# Patient Record
Sex: FEMALE | Race: WHITE
[De-identification: names, ages, dates, MRNs, and addresses within clinical notes are randomized per-mention and may not be internally consistent; named-entity substitution may affect disease eponyms.]

---

## 2017-05-19 ENCOUNTER — HOSPITAL ENCOUNTER (EMERGENCY)
Dept: HOSPITAL 25 - ED | Age: 82
Discharge: HOME | End: 2017-05-19
Payer: MEDICARE

## 2017-05-19 DIAGNOSIS — R21: Primary | ICD-10-CM

## 2017-05-19 PROCEDURE — 99282 EMERGENCY DEPT VISIT SF MDM: CPT

## 2017-05-20 VITALS — DIASTOLIC BLOOD PRESSURE: 74 MMHG | SYSTOLIC BLOOD PRESSURE: 156 MMHG

## 2017-07-29 ENCOUNTER — HOSPITAL ENCOUNTER (EMERGENCY)
Dept: HOSPITAL 25 - ED | Age: 82
Discharge: HOME | End: 2017-07-29
Payer: MEDICARE

## 2017-07-29 VITALS — DIASTOLIC BLOOD PRESSURE: 66 MMHG | SYSTOLIC BLOOD PRESSURE: 173 MMHG

## 2017-07-29 DIAGNOSIS — R51: ICD-10-CM

## 2017-07-29 DIAGNOSIS — Z88.0: ICD-10-CM

## 2017-07-29 DIAGNOSIS — M54.2: Primary | ICD-10-CM

## 2017-07-29 DIAGNOSIS — I10: ICD-10-CM

## 2017-07-29 DIAGNOSIS — F41.0: ICD-10-CM

## 2017-07-29 PROCEDURE — 99282 EMERGENCY DEPT VISIT SF MDM: CPT

## 2017-07-29 NOTE — ED
Braulio BLANTON Auryana, scribed for Misha Dumont MD on 07/29/17 at 1225 .





Neck Pain





- HPI Summary


HPI Summary: 





82 year old female presents with neck pain. Patient reports that the pain 

started 5 days ago while sleeping and was initially locates in the head - 

reports " whole head- face, sides" and hurts to touch but is now also present 

in her neck and shoulder. Daughter reports that her symptoms improved Tuesday 

and went about normal activities, but on Wednesday her symptoms worsened. She 

reports that the pain is a constant throbbing pain and does not radiate. She 

also c/o of decreased appetite due to the severity of the pain. She denies any 

fever, chills, photophobia, abdominal pain, arm pain, lower extremity pain, 

head injuries, or any falls. She reports normal urination and normal 

ambulation. She reports 1/2 tablet of Tylenol every 3-4 hours without any 

improvement. She denies any exposure to ticks - no gardening or pets. She is 

not on any blood thinners.  Patient reports 1 similar episode about one year 

ago that resolved after several months. PMHx is significant for HTN ( 

medication complaint), but no history of DM. SHx is significant for 3-4 oz. of 

wine a day but denies any tobacco use. Dr. Mckee is her PCP.





- History of Current Complaint


Chief Complaint: EDNeckComplaint


Stated Complaint: RADIATING NECK PAIN


Time Seen by Provider: 07/29/17 11:55


Hx Obtained From: Patient


Hx Last Menstrual Period: N/A


Onset/Duration Of Injury/Symptoms: Days


Timing: Constant


Onset/Duration: Gradual Onset, Started days ago, Still Present, Worse Since - 

WEDNESDAY- 1 DAY AGO


Severity Initially: Mild


Severity Currently: Moderate


Pain Intensity: 8


Pain Scale Used: 0-10 Numeric


Location: Discrete At: - started at whole head and has now radiated down the 

neck and shoulder


Alleviating Factors: Nothing


Related History: Similar Episode/Dx As: - see HPI





- Allergies/Home Medications


Allergies/Adverse Reactions: 


 Allergies











Allergy/AdvReac Type Severity Reaction Status Date / Time


 


Penicillins Allergy  Unknown Verified 07/29/17 11:32





   Reaction  





   Details  














PMH/Surg Hx/FS Hx/Imm Hx


Endocrine/Hematology History: 


   Denies: Hx Diabetes, Hx Thyroid Disease


Cardiovascular History: Reports: Hx Hypertension


   Denies: Hx Pacemaker/ICD


Respiratory History: 


   Denies: Hx Asthma, Hx Chronic Obstructive Pulmonary Disease (COPD)


GI History: 


   Denies: Hx Ulcer


Musculoskeletal History: 


   Denies: Hx Scoliosis


Sensory History: 


   Denies: Hx Hearing Aid


Neurological History: Reports: Hx Headaches


   Denies: Other Neuro Impairments/Disorders


Psychiatric History: Reports: Hx Panic Disorder - anxiety does ok mri does not 

like the noise with mri





- Cancer History


Hx Chemotherapy: No


Hx Radiation Therapy: No





- Surgical History


Surgery Procedure, Year, and Place: LEFT BREAST LUMPECTOMY 1958





- Immunization History


Date of Tetanus Vaccine: utd


Date of Influenza Vaccine: utd


Infectious Disease History: No


Infectious Disease History: 


   Denies: Hx Hepatitis, Hx Human Immunodeficiency Virus (HIV), Traveled 

Outside the [place] in Last 30 Days





- Family History


Known Family History: Positive: Cardiac Disease, Diabetes - brother -weight 

related, Other - breast ca





- Social History


Alcohol Use: Daily


Alcohol Amount: 1 glass of wine - apr 3 oz.


Substance Use Type: Reports: None, Prescribed


Smoking Status (MU): Never Smoked Tobacco





Review of Systems


Constitutional: Negative


Negative: Fever, Chills


Eyes: Negative


Negative: Photophobia


ENT: Negative


Cardiovascular: Negative


Respiratory: Negative


Positive: Other - decreased appetite due to pain.  Negative: Abdominal Pain


Genitourinary: Negative


Positive: Other - head pain that radiates to the neck and to the shoulders


Skin: Negative


Neurological: Negative


Psychological: Normal


All Other Systems Reviewed And Are Negative: Yes





Physical Exam





- Summary


Physical Exam Summary: 








The patient is well-nourished and is in mild acute pain distress.





The skin is warm and dry and skin color reflects adequate perfusion.





HEENT:  The head is normocephalic and atraumatic. The pupils are equal and 

reactive. The conjunctivae are clear and without drainage. Patient does not 

have any photophobia. Nares are patent and without drainage.  Mouth reveals 

moist mucous membranes and the throat is without erythema and exudate.  The 

external ears are intact. The ear canals are patent and without drainage. The 

tympanic membranes are intact.





Neck is supple with full range of motion and non-tender. There are no carotid 

bruits.  There is no neck vein distension. There is tenderness at the atlanto-

occipital joint. There is no spinous process tenderness. There are spasms over 

the paracervical vertebrae. 





Respiratory: Chest is non-tender.  Lungs are clear to auscultation and breath 

sounds are symmetrical and equal.





Cardiovascular: Hear is regular rate and rhythm.  There is no murmur or rub 

auscultated.   There is no peripheral edema and pulses are symmetrical and 

equal.





Abdomen: The abdomen is soft and non-tender.  There are normal bowel sounds 

heard in all four quadrants and there is no organomegaly palpated.





Musculoskeletal: There is no back pain noted.  Extremities are non-tender with 

full range of motion and good motor strength.  There is good capillary refill.  

There is no peripheral edema or calf tenderness elicited. There is no 

tenderness in the thoracic or lumbar region.





Neurological: Patient is alert and oriented to person, place and time.  The 

patient has symmetrical motor strength in all four extremities.  Cranial nerves 

are grossly intact. Deep tendon reflexes are symmetrical and equal in all four 

extremities. No focal neurological deficits.





Psychiatric: The patient has an appropriate affect and does not exhibit any 

anxiety or depression. 


Triage Information Reviewed: Yes


Vital Signs On Initial Exam: 


 Initial Vitals











Temp Pulse Resp BP Pulse Ox


 


 99.5 F   68   16   151/61   96 


 


 07/29/17 11:32  07/29/17 11:32  07/29/17 11:32  07/29/17 11:32  07/29/17 11:32











Vital Signs Reviewed: Yes





- Tatamy Coma Scale


Coma Scale Total: 15





Diagnostics





- Vital Signs


 Vital Signs











  Temp Pulse Resp BP Pulse Ox


 


 07/29/17 11:32  99.5 F  68  16  151/61  96














- Laboratory


Lab Statement: Any lab studies that have been ordered have been reviewed, and 

results considered in the medical decision making process.





Re-Evaluation





- Re-Evaluation


  ** First Eval


Re-Evaluation Time: 13:06 - patients symptoms are much improved


Change: Improved





Neck Course/Dx





- Course


Assessment/Plan: 82 year old female presents with neck pain. Patient reports 

that the pain started 5 days ago while sleeping and was initially locates in 

the head - reports "whole head- face, sides" and hurts to touch but is now also 

present in her neck and shoulder. Daughter reports that her symptoms improved 

Tuesday and went about normal activities, but on Wednesday her symptoms 

worsened. She reports that the pain is a constant throbbing pain and does not 

radiate. She also c/o of decreased appetite due to the severity of the pain. 

She denies any fever, chills, photophobia, abdominal pain, arm pain, lower 

extremity pain, head injuries, or any falls. She reports normal urination and 

normal ambulation. She reports 1/2 tablet of Tylenol every 3-4 hours without 

any improvement. She denies any exposure to ticks - no gardening or pets. She 

is not on any blood thinners.  Patient reports 1 similar episode about one year 

ago that resolved after several months. PMHx is significant for HTN (medication 

complaint), but no history of DM. SHx is significant for 3-4 oz. of wine a day 

but denies any tobacco use. Dr. Mckee is her PCP.  Her daughter reports an 

allergy to a previous medication and that the patient is reluctant to take 

anything but Tylenol, however, Tylenol has not improved her symptoms.  Pain 

management was discussed and the patient and daughter agreed to try ibuprofen 

and tramadol for pain management.  On re-evaluation, the patient reports 

improvement of symptoms. Patient will be discharged home with prescription for 

ibuprofen and tramadol and follow up with PCP. Patient agrees with plan.  Dx: 

neck pain, headache.





- Diagnoses


Differential Dx/HQI/PQRI: Positive: Other - degenerative disc disease, acute 

cervical strain and sprain


Provider Diagnoses: 


 Neck pain, Headache








Discharge





- Discharge Plan


Condition: Stable


Disposition: HOME


Prescriptions: 


Ibuprofen TAB* [Advil TAB*] 400 mg PO Q8H PRN #20 tab


 PRN Reason: pain


traMADol TAB* [Ultram*] 25 mg PO Q8H PRN #15 tab MDD 3


 PRN Reason: pain


Patient Education Materials:  Neck Pain (ED), Acute Headache (ED)


Referrals: 


Alice Mckee MD [Primary Care Provider] - 2 Days





The documentation as recorded by the Braulio patel Auryana accurately 

reflects the service I personally performed and the decisions made by me, Misha Dumont MD.

## 2017-10-12 ENCOUNTER — HOSPITAL ENCOUNTER (INPATIENT)
Dept: HOSPITAL 25 - ED | Age: 82
LOS: 7 days | Discharge: PSYCHIATRIC OR SUBSTANCE USE FACILITY | DRG: 885 | End: 2017-10-19
Attending: INTERNAL MEDICINE | Admitting: INTERNAL MEDICINE
Payer: MEDICARE

## 2017-10-12 DIAGNOSIS — E61.1: ICD-10-CM

## 2017-10-12 DIAGNOSIS — R53.1: ICD-10-CM

## 2017-10-12 DIAGNOSIS — E83.42: ICD-10-CM

## 2017-10-12 DIAGNOSIS — M85.80: ICD-10-CM

## 2017-10-12 DIAGNOSIS — D47.3: ICD-10-CM

## 2017-10-12 DIAGNOSIS — R62.7: ICD-10-CM

## 2017-10-12 DIAGNOSIS — G89.29: ICD-10-CM

## 2017-10-12 DIAGNOSIS — Z88.8: ICD-10-CM

## 2017-10-12 DIAGNOSIS — I10: ICD-10-CM

## 2017-10-12 DIAGNOSIS — Z63.4: ICD-10-CM

## 2017-10-12 DIAGNOSIS — M54.2: ICD-10-CM

## 2017-10-12 DIAGNOSIS — Z88.0: ICD-10-CM

## 2017-10-12 DIAGNOSIS — E87.6: ICD-10-CM

## 2017-10-12 DIAGNOSIS — F41.9: ICD-10-CM

## 2017-10-12 DIAGNOSIS — F33.3: Primary | ICD-10-CM

## 2017-10-12 DIAGNOSIS — R51: ICD-10-CM

## 2017-10-12 DIAGNOSIS — Z82.49: ICD-10-CM

## 2017-10-12 LAB
ALBUMIN SERPL BCG-MCNC: 3.6 G/DL (ref 3.2–5.2)
ALP SERPL-CCNC: 50 U/L (ref 34–104)
ALT SERPL W P-5'-P-CCNC: 10 U/L (ref 7–52)
ANION GAP SERPL CALC-SCNC: 11 MMOL/L (ref 2–11)
AST SERPL-CCNC: 15 U/L (ref 13–39)
BUN SERPL-MCNC: 20 MG/DL (ref 6–24)
BUN/CREAT SERPL: 31.7 (ref 8–20)
CALCIUM SERPL-MCNC: 8.7 MG/DL (ref 8.6–10.3)
CHLORIDE SERPL-SCNC: 105 MMOL/L (ref 101–111)
GLOBULIN SER CALC-MCNC: 2.3 G/DL (ref 2–4)
GLUCOSE SERPL-MCNC: 83 MG/DL (ref 70–100)
HCO3 SERPL-SCNC: 25 MMOL/L (ref 22–32)
HCT VFR BLD AUTO: 42 % (ref 35–47)
HGB BLD-MCNC: 13.8 G/DL (ref 12–16)
MAGNESIUM SERPL-MCNC: 1.7 MG/DL (ref 1.9–2.7)
MCH RBC QN AUTO: 27 PG (ref 27–31)
MCHC RBC AUTO-ENTMCNC: 33 G/DL (ref 31–36)
MCV RBC AUTO: 81 FL (ref 80–97)
POTASSIUM SERPL-SCNC: 2.9 MMOL/L (ref 3.5–5)
PROT SERPL-MCNC: 5.9 G/DL (ref 6.4–8.9)
RBC # BLD AUTO: 5.21 10^6/UL (ref 4–5.4)
SODIUM SERPL-SCNC: 141 MMOL/L (ref 133–145)
WBC # BLD AUTO: 11.5 10^3/UL (ref 3.5–10.8)

## 2017-10-12 PROCEDURE — 84100 ASSAY OF PHOSPHORUS: CPT

## 2017-10-12 PROCEDURE — 81003 URINALYSIS AUTO W/O SCOPE: CPT

## 2017-10-12 PROCEDURE — 84132 ASSAY OF SERUM POTASSIUM: CPT

## 2017-10-12 PROCEDURE — 80329 ANALGESICS NON-OPIOID 1 OR 2: CPT

## 2017-10-12 PROCEDURE — 71020: CPT

## 2017-10-12 PROCEDURE — 83735 ASSAY OF MAGNESIUM: CPT

## 2017-10-12 PROCEDURE — 80320 DRUG SCREEN QUANTALCOHOLS: CPT

## 2017-10-12 PROCEDURE — 80053 COMPREHEN METABOLIC PANEL: CPT

## 2017-10-12 PROCEDURE — 36415 COLL VENOUS BLD VENIPUNCTURE: CPT

## 2017-10-12 PROCEDURE — 84443 ASSAY THYROID STIM HORMONE: CPT

## 2017-10-12 PROCEDURE — 85025 COMPLETE CBC W/AUTO DIFF WBC: CPT

## 2017-10-12 PROCEDURE — G0480 DRUG TEST DEF 1-7 CLASSES: HCPCS

## 2017-10-12 PROCEDURE — 93005 ELECTROCARDIOGRAM TRACING: CPT

## 2017-10-12 PROCEDURE — 80307 DRUG TEST PRSMV CHEM ANLYZR: CPT

## 2017-10-12 RX ADMIN — HEPARIN SODIUM SCH: 5000 INJECTION INTRAVENOUS; SUBCUTANEOUS at 16:11

## 2017-10-12 RX ADMIN — POTASSIUM CHLORIDE SCH MLS/HR: 200 INJECTION, SOLUTION INTRAVENOUS at 18:06

## 2017-10-12 RX ADMIN — HEPARIN SODIUM SCH UNITS: 5000 INJECTION INTRAVENOUS; SUBCUTANEOUS at 16:03

## 2017-10-12 RX ADMIN — POTASSIUM CHLORIDE SCH: 200 INJECTION, SOLUTION INTRAVENOUS at 22:04

## 2017-10-12 RX ADMIN — AMLODIPINE BESYLATE SCH MG: 5 TABLET ORAL at 16:03

## 2017-10-12 RX ADMIN — HEPARIN SODIUM SCH: 5000 INJECTION INTRAVENOUS; SUBCUTANEOUS at 22:04

## 2017-10-12 SDOH — SOCIAL STABILITY - SOCIAL INSECURITY: DISSAPEARANCE AND DEATH OF FAMILY MEMBER: Z63.4

## 2017-10-12 NOTE — HP
HISTORY AND PHYSICAL:

 

DATE OF ADMISSION:  10/12/17.

 

HISTORY OF PRESENT ILLNESS:  Danae Ferreira is an 82-year-old woman admitted with 
weakness, refusal to take medications, who has been eating and drinking very 
little since her   5 days ago.  He had been sick for 6 years with 
renal cell cancer, had done fairly well until recently when he was placed on 
hospice.  The patient was present when he .  Since then she has been 
staying in bed.  She has been refusing to eat or drink except for perhaps 2 
crackers and a small amount of cheese every day.  She was brought by her family 
to the emergency room on 10/09/17.  She had a psychiatric evaluation.  At that 
time she was felt to be sad, depressed.  It was noted that she had attempted 
suicide with a drug overdose in ,  had previous delusional behavior.  It 
was deemed that she was not appropriate for mental health admission at that 
time.  She was medically cleared.  She was sent home.  Since then she has been 
staying in bed, not eating.  She has expressed psychotic thoughts such as that 
the devil was coming to take her and that she was going to burn in hell.  The 
family called me.  The grief counsellor from \[facility]", Angelina Guadalupe, went to 
see her  yesterday, and felt that she was not able to deal with her grief until 
her psychiatric condition is addressed.  She had been giving away her personal 
items such as her wedding ring to her daughter, and was felt to be suicide 
risk.  I spoke with Dr. Wheeler who recommended she come back to the emergency 
room.  I am seeing her at this time and will be admitting her.

 

PAST MEDICAL HISTORY:  Is otherwise significant for the following medical 
problems:

 

1.  Hypertension.

2.  History of depression, on sertraline.

3.  Osteopenia.

4.  Chronic neck pain.

5.  History of anxiety.

 

PAST SURGICAL HISTORY:  Includes breast biopsy, age 23, benign.

 

CURRENT MEDICATIONS:  Are as follows but she has not been taking them regularly 
for several weeks according to the family:

 

1.  Magnesium 250 mg daily.

2.  Sertraline 100 mg daily.

3.  Tylenol 500 mg half tablet twice a day.

4.  Triamcinolone hexacetonide 0.1% b.i.d., p.r.n.

5.  Nystatin cream b.i.d., under breast.

6.  Hydroxyzine 10 mg q. 4 h. p.r.n. itching.

7.  Ciclopirox 0.77% topical cream b.i.d. as needed for rash.

8.  Lorazepam 0.5 mg q. 6 h, p.r.n. anxiety.

9.  Amlodipine 5 mg daily.

10.  Cod liver oil 1 daily .

11.  Vitamin D3 400 units daily.

12.  Viactiv 500/500/40 one daily.

13.  Ibuprofen 200 mg q. 6 h. p.r.n.

14.  Triamterene 1 every day.

15.  Potassium chloride 10 mEq 2 every day.

 

ALLERGIES:  FLUCONAZOLE caused dizziness, PENICILLIN.  She previously had a 
rash this summer, felt to be a drug rash, etiology of this rash was not clearly 
determined.

 

HABITS:  Tobacco none.  ETOH, none.

 

FAMILY HISTORY:  Positive for osteoporosis, congestive heart failure, MI.

 

SOCIAL AND PERSONAL HISTORY:  The patient is retired.  She is recently  (
see above).  She has 2 adult children that live in the area.  Another son lives 
out of the area, his name is Jeferson.  He is here with his wife, Leyla,  jimmy.

 

REVIEW OF SYSTEMS:  Generally, she has been very weak.  Her appetite has been 
poor. She denied fevers, chills or sweats.  She has been staying in bed.  Skin:
  See above.  HEENT:  Negative.  Nodes:  Negative.  Heme:  Negative.  Breasts:  
Negative. Endocrine:  Negative.  Respiratory:  Negative.  Cardiovascular:  
Hypertension.  She has not been taking her medications.  GI:  Negative.  :  
Negative.  GYN: Negative.  Musculoskeletal:  See above.  Neuro:  See above.  
Psychiatric:  See above.

 

                               PHYSICAL EXAMINATION

 

GENERAL:  She is elderly white female, in no acute distress, lying on the 
stretcher with her eyes closed, not responding to my questions.  She does not 
follow commands.  Family does state that prior to her being taken here by 
ambulance she was refusing to go and actually crawled away from her bed to get 
away from the ambulance attendants.

 

VITAL SIGNS:  Blood pressure 164/73, pulse 72, respirations 24, temperature 99.1
, O2 sat 92%.

 

HEENT:  Atraumatic.  Normocephalic.  Eyes closed.  Mouth closed, unable to open 
mouth.

 

NECK:  Supple.  No thyromegaly.

 

CHEST:  Clear.

 

HEART:  Normal S2, there is a grade 1/6 to 2/6 systolic murmur.

 

ABDOMEN:  Soft.  Nontender.  No masses or organomegaly.  Bowel sounds are 
active.

 

EXTREMITIES:  Are without cyanosis, clubbing, or edema.

 

NEUROLOGIC:  She does not follow commands.  DTR are 1 to 2+ symmetric.

 

SKIN:  Is warm and dry.

 

 LABORATORY DATA:  Laboratory is pending.  Labs from 10/09/17, showed a normal 
CBC with a slightly low MCV.  Potassium 2.6 but came up to 3.6.  Negative tox 
screen. Negative UA.

 

IMPRESSION:  Patient with weakness and depression status post death of her 
. She is having delusions so will get a psychiatric evaluation, I will 
give her IV fluids. Labs have been ordered.  She will get heparin for DVT 
prophylaxis.  She is a full code.  I found her healthcare proxy form  and it is 
her . I have asked the family to try to get her to name another 
healthcare proxy.  We will check a ferritin level because of her low MCV.

 

 

 

[id number]/[facility] #: [id number]

[facility]

## 2017-10-13 RX ADMIN — HEPARIN SODIUM SCH: 5000 INJECTION INTRAVENOUS; SUBCUTANEOUS at 14:36

## 2017-10-13 RX ADMIN — HEPARIN SODIUM SCH: 5000 INJECTION INTRAVENOUS; SUBCUTANEOUS at 22:00

## 2017-10-13 RX ADMIN — HEPARIN SODIUM SCH: 5000 INJECTION INTRAVENOUS; SUBCUTANEOUS at 06:10

## 2017-10-13 RX ADMIN — AMLODIPINE BESYLATE SCH MG: 5 TABLET ORAL at 09:41

## 2017-10-13 NOTE — CONS
CONSULTATION REPORT:

 

DATE OF CONSULTATION / DICTATION:  10/13/17

 

ATTENDING PHYSICIAN:  Alice Mckee MD

 

CONSULTING PHYSICIAN:  Prasanth Wheeler MD

 

REASON FOR CONSULT:  Suicidal depression.

 

SUBJECTIVE HISTORY:  The patient is an 82-year-old recently  white 
female with a history of episodic depression and psychosis who is brought to 
the hospital by her family due to concerns that she has not been eating or 
drinking since her   of renal cancer 5 days prior.  Apparently, he 
had been sick for 6 years with cancer but had recently  in hospice care.  
According to the family, the patient has been staying in bed since his death 
refusing to eat or drink.  She was brought by the family to the emergency room 
on 10/09/17 where she received a psychiatric evaluation, although her condition 
was not deemed to require restrictive inpatient care at that time.  The family 
remained quite concerned about her behavior.  They indicate that she has a long 
history of depression and had been hospitalized on several occasions and 
received successful treatment.  They indicated that this recent episode of 
depression started approximately 1-1/2 years ago when she was the primary 
caregiver for her .  Similar to previous episodes, she was expressing a 
fear that Vince was coming to take her due to unforgivable sins that she would 
burn in hell for.  She was also delusionally convinced that her daughter was 
keeping her  still alive at her house.  The family indicates that the 
patient has been nonadherent with her prescribed Zoloft for at least the past 
month.  They indicate that in her home they found a bottle of Zoloft with a 
fill date of 17, which had 35 of 45 pills remaining.  On exam, the 
patient is cooperative and expressive.  She speaks spontaneously about the 
difficulty she had taking care of her  until he  and she does admit 
that she still believes that he is alive.  When asked about the delusions 
regarding Satan coming to get her, she admits that she is experiencing these 
thoughts as well.  The patient is screened for neurovegetative symptoms of 
depression and she does endorse sleeplessness, anhedonia, guilt, poor energy, 
concentration difficulties, lack of appetite, psychomotor retardation, and 
thoughts of death. When asked about her antidepressants, she denies 
nonadherence insisting that she has kept up with all of her medications.  When 
I spoke with the nurse currently taking care of her, they indicate that she has 
eaten a little bit and showered today and appears to be more talkative and 
expressive compared to when she arrived. The patient is still endorsing 
suicidal ideations believing that the best thing for her would be left alone; 
however, with a little encouragement, she is willing to come to the behavioral 
science unit.

 

PAST PSYCHIATRIC HISTORY:  The patient indicates she has had between 2 and 3 
prior admissions, all here at [facility], but the most recent being 
well over 20 years ago.  She indicates she has been on several antidepressants; 
however, she does not recall the names of any of these.  The patient indicates 
that she did have a suicide attempt in  whereas it is documented that this 
was via a drug overdose.  The patient believes that she actually attempted to 
hang herself.  The patient has no history of violence towards others.  She 
denies any history of being a victim of abuse or neglect.  She has no history 
of traumatic brain injury.

 

SUBSTANCE ABUSE HISTORY:  Negative for alcohol or illicit drugs.  She is a 
nonsmoker.

 

PAST MEDICAL HISTORY:  Significant for:

1.  Hypertension.

2.  Osteopenia.

3.  Chronic neck pain.

 

CURRENT MEDICATIONS:

1.  Magnesium 250 mg daily.

2.  Sertraline 100 mg daily.

3.  Tylenol 500 mg twice daily.

4.  Triamcinolone hexacetonide 0.1 mg b.i.d. as a p.r.n.

5.  Nystatin cream b.i.d. under breasts.

6.  Hydroxyzine 10 mg every 4 hours for itching.

7.  Ciclopirox 0.77% topical cream b.i.d. as needed for rash.

8.  Lorazepam 0.5 mg every 6 hours as a p.r.n. for anxiety.

9.  Amlodipine 5 mg p.o. daily.

10.  Cod liver oil 1 daily.

11.  Vitamin D3 400 units p.o. daily.

12.  Viactiv 500/500/40 one daily.

13.  Ibuprofen 200 mg every 6 hours as needed for pain.

14.  Triamterene 1 every day.

15.  Potassium chloride 10 mEq twice daily.

 

ALLERGIES:  She is allergic to FLUCONAZOLE, which causes dizziness and 
PENICILLIN.

 

FAMILY PSYCHIATRIC HISTORY:  Noncontributory.

 

SOCIAL HISTORY:  The patient was born in [facility], the oldest of 3 
children to an intact family.  She does have a college degree, which was a 
bachelor's in science and home economics from a university in [place].  She 
[place] [place] in  where her  was pursuing an industrial 
engineering degree.  She has been  x1 and they were  61 years 
until his death 6 days ago. Currently, she is living alone in a one-shabana 
building.  She has 3 children, a son named Darrell, second son named Kamran, and 
a daughter named Chanell.  The patient worked odd jobs such as substitute 
teaching and as an .  She did own rental property and 
had income from this.  She self-identifies as strongly Church and attends the 
[employer] in [place], [place]. The patient has no 
discernable legal or  history.

 

MENTAL STATUS EXAM:  The patient is an aging white female who is somewhat thin 
and frail appearing.  She is lying in bed, slightly propped up with rosary 
beads around her wrists and hand and laced through her fingers.  She is calm, 
cooperative. Makes fair eye contact.  Her speech is expressive with normal rate
, tone, and volume.  She is clearly depressed with a constricted affect.  
Thought process is linear, goal directed.  Thought content is significant for 
delusions that her  is still alive and that the devil is coming to get 
her for her past transgressions.  She does deny auditory or visual 
hallucinations.  The patient endorses passive suicidal thoughts, but denies any 
active plan to end her life. She denies homicidality.  Insight and judgment are 
poor given her limited adherence to antidepressant medication.  Cognitively, 
she is awake and alert with what would appear to be an average intellect.

 

DIAGNOSES: 

Axis I:  Major depressive disorder, recurrent, severe with psychotic features. 

Axis II:  Deferred.

 

ASSESSMENT:  The patient is an 82-year-old recently  white female with a 
history of recurrent psychotic depression who was brought to the hospital for 
the second time in 3 days secondary to failure to thrive, delusions that her 
  is still alive and passive suicidal thoughts.  At this time, 
she is hospitalized on the medical service and receiving IV fluids and her p.o. 
intake appears to be improving.  I do think the patient would benefit from 
further inpatient care on the behavioral science unit and I am advocating that 
she be transferred to my service on the behavioral science unit.  I have spoken 
with Dr. Mckee and made her aware of my findings.

 

RECOMMENDATIONS TO PRIMARY TEAM:  I recommend that the patient be transferred 
tomorrow, which is 10/14/17 to the behavioral science unit.  This clinician 
will not be present in the hospital until Monday and therefore, I will refer 
the matter to the on-call psychiatrist, Dr. Surekha Cruz, who will be the 
admitting clinician. For now, I do not recommend any changes in her medications
, although I discussed with both the patient and her family the introduction of 
low dose Seroquel 25 mg p.o. q.h.s. as an augmentation strategy for her 
antidepressant and she should receive some additional antipsychotic benefit 
from this.

 

Thank you for allowing me to participate in the care of this toby patient, 
and Psychiatry will continue to follow until she is transferred to our service.

 

 [id number]/[facility] #: [id number]

DEIRDRE

## 2017-10-14 RX ADMIN — QUETIAPINE SCH MG: 25 TABLET, FILM COATED ORAL at 21:28

## 2017-10-14 RX ADMIN — HEPARIN SODIUM SCH: 5000 INJECTION INTRAVENOUS; SUBCUTANEOUS at 21:30

## 2017-10-14 RX ADMIN — AMLODIPINE BESYLATE SCH MG: 5 TABLET ORAL at 07:21

## 2017-10-14 RX ADMIN — HEPARIN SODIUM SCH: 5000 INJECTION INTRAVENOUS; SUBCUTANEOUS at 13:27

## 2017-10-14 RX ADMIN — HEPARIN SODIUM SCH: 5000 INJECTION INTRAVENOUS; SUBCUTANEOUS at 04:10

## 2017-10-14 RX ADMIN — SERTRALINE HYDROCHLORIDE SCH MG: 100 TABLET, FILM COATED ORAL at 11:06

## 2017-10-14 NOTE — TRS
CC:  Prasanth Wheeler MD *

 

TRANSFER SUMMARY:

 

DATE OF ADMISSION:  10/12/17

 

DATE OF DISCHARGE:  10/19/17

 

TRANSFER DIAGNOSES:

1.  Suicidal depression with psychotic features.

2.  History of hypertension.

3.  Hypokalemia.

4.  Low mean corpuscular volume, possible iron deficiency.

5.  Hypomagnesemia.

6.  Thrombocytosis, possibly related to iron deficiency.

7.  Chronic neck pain.

8.  History of anxiety.

9.  Osteopenia.

 

HISTORY:  Danae Ferreira is an 82-year-old woman admitted with weakness, refusal 
to take medications, eating and drinking very little since the death of her 
 5 days previous.  Please see the dictated admission note for details of 
the present illness, past medical history, family history, social and personal 
history, review of systems, and physical examination.

 

LABORATORY DATA:  CBC on 10/12/17, WBC 11.5, H and H 13.8/42, MCV 81 (MCV a few 
days ago had been 79).  PLT 589K.  Chemistries:  Sodium 141, potassium 2.9, 
chloride 105, CO2 25, BUN and creatinine 20/0.63, glucose 83, calcium 8.7.  
Rest of the comprehensive metabolic panel was within normal limits except for 
magnesium 1.7, total protein 5.9.

 

IMAGING:  None.

 

CONSULTATION NOTE:  Dr. Wheeler, Psychiatry, 10/13/17, he felt that the patient 
had a recurrent psychotic depression, who had been brought to the hospital for 
the second time in 3 days secondary to failure to thrive, delusions that her 
  is still alive and passive suicidal thoughts.  He felt that 
she was improving with being on the medical service and having received IV 
fluids.  He did think that the patient would benefit from further inpatient 
care on the behavioral science unit and advocated that she be transferred to 
his service on the behavioral science unit.

 

HOSPITAL COURSE:  The patient was admitted.  She was placed on IV fluids.  
Heparin was used for DVT prophylaxis.  Because she was not speaking when she 
first admitted, a MOLST form could not be completed and her previous healthcare 
proxy had been only her  who has now .  She was made a full 
code.  This will need to be addressed once her depression has been treated.  
Heparin was ordered for DVT prophylaxis, but she refused it.  She refused labs 
after her initial admission labs.  She did agree to take antihypertensives when 
her blood pressure was 182/69 on the morning of 10/13/17.  She also agreed to 
take antidepressants.  She is being restarted on amlodipine and sertraline.  
She began to eat and drink.  She did pull out her IV.  At the time of transfer, 
she is to be on sertraline 100 mg daily, amlodipine 5 mg daily, vitamin D3 1000 
units daily and Seroquel 25 mg which is recommended by Dr. Wheeler.  A ferritin 
is pending, added on to the blood work done when she was admitted.  They have 
been ordered for the day following admission, but she had refused further blood 
work.

Addendum: She was supposed to have been transferred to the behavioral health 
unit on 10/13 but they did not accept her because of bed availability until 10/
19. A transfer to [place] [facility] was suggested but her family 
felt it would be a hardship on her and on them for her to go that far away. She 
did eat and drink. She refused blood work because she felt it sucked the life 
out of her but did agree to a chest Xray and ECG. She initially felt 
oversedated with Seroquel 25 mg but then didn't sleep well with 12.5 mg so dose 
then put back to 25 mg. Dr. Wheeler did continue to see her in psychiatric 
consultation while on the medical floor. She continued to have delusional 
ideation and  passive suicidal thoughts. She did walk around the unit and 
showered prior to transfer. 



 [id number]/[facility] #: [id number]

MTDD

## 2017-10-15 RX ADMIN — HEPARIN SODIUM SCH: 5000 INJECTION INTRAVENOUS; SUBCUTANEOUS at 21:49

## 2017-10-15 RX ADMIN — HEPARIN SODIUM SCH: 5000 INJECTION INTRAVENOUS; SUBCUTANEOUS at 06:43

## 2017-10-15 RX ADMIN — QUETIAPINE SCH MG: 25 TABLET, FILM COATED ORAL at 21:40

## 2017-10-15 RX ADMIN — AMLODIPINE BESYLATE SCH MG: 5 TABLET ORAL at 09:02

## 2017-10-15 RX ADMIN — SERTRALINE HYDROCHLORIDE SCH MG: 100 TABLET, FILM COATED ORAL at 09:02

## 2017-10-15 RX ADMIN — HEPARIN SODIUM SCH: 5000 INJECTION INTRAVENOUS; SUBCUTANEOUS at 12:02

## 2017-10-15 NOTE — PN
Subjective





- Subjective


Reason for Note: Progress Note


History: 





I discussed Danae Ferreira with Dr. Alice Mckee.  She is awaiting transfer to 

the Behavioral Health Unit.  She tells me she is worn out, tired.  She slept in 

"bouts" last night and doesn't feel refreshed. She ate an egg this morning from 

her breakfast.  She has walked to the door of her room and back.  She denies 

any focal symptoms.  She exhibits no delusional behavior.


Active Problems: 


 Active Problems





Bereavement (Acute) Z63.4


Depression (Acute) F32.9


Fatigue (Acute) R53.83


Essential hypertension (Chronic) I10








Current Medications: 


 Current Medications





Amlodipine Besylate (Norvasc Tab*)  5 mg PO DAILY [facility]


   Last Admin: 10/14/17 07:21 Dose:  5 mg


Heparin Sodium (Porcine) (Heparin Vial(*))  5,000 units SUBCUT Q8HR [facility]


   Last Admin: 10/15/17 06:43 Dose:  Not Given


Potassium Chloride/Sodium Chloride (Ns 0.9% W/ 40 Meq Kcl 1000 Ml*)  1,000 mls 

@ 100 mls/hr IV PER RATE [facility]


   Last Admin: 10/12/17 16:00 Dose:  100 mls/hr


Quetiapine Fumarate (Seroquel Tab*)  25 mg PO BEDTIME [facility]


   Last Admin: 10/14/17 21:28 Dose:  25 mg


Sertraline HCl (Zoloft*)  100 mg PO DAILY [facility]


   Last Admin: 10/14/17 11:06 Dose:  100 mg








Home Medications: 


 Home Medications











 Medication  Instructions  Recorded  Confirmed  Type


 


Acetaminophen [Tylenol Extra 250 mg PO BID 05/28/13 10/12/17 History





Strength]    


 


Triamterene/HCTZ 37.5-25 MG* 1 cap PO DAILY 05/28/13 10/12/17 History





[Dyazide*]    


 


Calcium W/ Vitamins D & K [Viactiv 1 chw PO DAILY 10/09/17 10/12/17 History





500-500-40 mg-Unt-Mcg]    


 


Cholecalciferol TAB* [Vitamin D 400 unit PO DAILY 10/09/17 10/12/17 History





TAB*]    


 


Ciclopirox Olamine [Ciclopirox] 0.77 % TOPICAL BID 10/09/17 10/12/17 History


 


Cod Liver Oil 1 cap PO DAILY 10/09/17 10/12/17 History


 


Ibuprofen TAB* [Advil TAB*] 200 mg PO Q6HR PRN 10/09/17 10/12/17 History


 


LORazepam TAB(*) [Ativan 0.5 MG 0.5 mg PO Q6HR PRN 10/09/17 10/12/17 History





TAB (*)]    


 


Magnesium [Essential Magnesium] 250 mg PO DAILY 10/09/17 10/12/17 History


 


Nystatin CREAM* [Nystatin Cream*] 1 applic TOPICAL BID 10/09/17 10/12/17 History


 


Potassium Chlor TAB (NF) 20 meq PO DAILY 10/09/17 10/12/17 History





[Kaon-Cl-10 TAB (NF)]    


 


Sertraline* [Zoloft*] 100 mg PO DAILY 10/09/17 10/12/17 History


 


Triamcinolone 0.1% CREAM(NF) 1 applic TOPICAL BID 10/09/17 10/12/17 History





[Kenalog Cream 0.1%(NF)]    


 


Triamcinolone 0.5% CREAM(NF) 1 applic TOPICAL BID 10/09/17 10/12/17 History





[Triamcinolone 0.5% CREAM*]    


 


amLODIPine TAB* [Norvasc 5 mg TAB*] 5 mg PO DAILY 10/09/17 10/12/17 History


 


hydrOXYzine HCL TAB* [Atarax 10 MG 10 mg PO Q4HR PRN 10/09/17 10/12/17 History





TAB*]    











Allergies: 


 Allergies











Allergy/AdvReac Type Severity Reaction Status Date / Time


 


Fluconazole Allergy  Unknown Verified 10/12/17 19:03





   Reaction  





   Details  


 


Penicillins Allergy  Unknown Verified 10/12/17 11:47





   Reaction  





   Details  














Objective





- Vital Signs


Vital Signs: 


 Vital Signs











  10/14/17 10/14/17 10/14/17





  11:07 15:23 15:32


 


Temperature  97.8 F 98.3 F


 


Pulse Rate  80 78


 


Respiratory  20 18





Rate   


 


Blood Pressure 152/68 144/57 150/68





(mmHg)   


 


O2 Sat by Pulse  97 





Oximetry   














  10/14/17 10/14/17 10/15/17





  19:20 20:00 08:03


 


Temperature 97.9 F  97.9 F


 


Pulse Rate 73  71


 


Respiratory 16 18 16





Rate   


 


Blood Pressure 163/72  166/59





(mmHg)   


 


O2 Sat by Pulse 96  96





Oximetry   














- Intake and Output


Intake and Output: 


 Intake & Output











 10/12/17 10/13/17 10/14/17 10/15/17





 11:59 11:59 11:59 11:59


 


Intake Total  968 665 600


 


Output Total  0 675 2


 


Balance  968 -10 598


 


Weight  117 lb 3.2 oz  


 


Intake:    


 


  IV Fluids  333  


 


    NS (0.9%) 40 meq KCL  333  


 


  IVPB  185  


 


    NS (0.9%) 40 meq KCL  185  


 


  Oral  450 665 600


 


Output:    


 


  Urine  0 675 


 


  Straight Cath    2


 


Other:    


 


  Estimated Void    Medium


 


  # Bowel Movements  0 0 0


 


  Estimated Stool Amount   Medium 


 


  # Voids   0 1





 





ADLs: Meal  Record                                         Start:  10/12/17 13:

34


Freq:   DAILY@0900,1400,1800                               Status: Active      

  


 Created      10/12/17 13:34  System  (Rec: 10/12/17 13:34  System  MED-C13)


 Document     10/12/17 18:00  [id number]  (Rec: 10/12/17 19:05  [id number]  MED-C09)


 Document     10/13/17 09:00  [id number]  (Rec: 10/13/17 09:51  [id number]  MED-C09)


 Document     10/13/17 13:53  [id number]  (Rec: 10/13/17 13:55  [id number]  MED-C09)


 Document     10/13/17 18:00  [id number]  (Rec: 10/13/17 18:37  [id number]  MED-C09)


 Document     10/14/17 09:00  [id number]  (Rec: 10/14/17 09:17  [id number]  MED-C09)


 Document     10/14/17 13:39  [id number]  (Rec: 10/14/17 13:39  [id number]  MED-C09)


 Document     10/14/17 17:51  [id number]  (Rec: 10/14/17 17:51  [id number]  MED-C11)


Intake and Output                                          Start:  10/12/17 11:

46


Freq:                                                      Status: Cancelled   

  


 Created      10/12/17 11:46  System  (Rec: 10/12/17 11:46  System  ED-C26)


Intake and Output                                          Start:  10/12/17 13:

34


Freq:   DAILY@0600,1400,2200                               Status: Cancelled   

  


 Created      10/12/17 13:34  System  (Rec: 10/12/17 13:34  System  MED-C13)


Intake and Output                                          Start:  10/12/17 14:

47


Freq:   06,14,2200                                         Status: Active      

  


 Created      10/12/17 15:01  [id number]  (Rec: 10/12/17 15:01  BKG  [id number])


 Document     10/13/17 06:00  [id number]  (Rec: 10/13/17 06:31  [id number]  MED-C42)


 Document     10/13/17 13:53  [id number]  (Rec: 10/13/17 13:55  [id number]  MED-C09)


 Document     10/13/17 22:00  [id number]  (Rec: 10/13/17 22:14  [id number]  MED-C09)


 Document     10/14/17 06:00  [id number]  (Rec: 10/14/17 06:19  [id number]  MED-C42)


 Document     10/14/17 13:20  [id number]  (Rec: 10/14/17 13:20  [id number]  MED-M01)


 Document     10/14/17 19:40  [id number]  (Rec: 10/14/17 19:40  [id number]  MED-C11)


 Document     10/15/17 06:00  [id number]  (Rec: 10/15/17 06:12  [id number]  MED-C42)











- Physical Exam


General Physical Exam Comment: Holding her rosary, sad, but answers questions 

appropriately.





Assessment





- Problem List


Assessment: 


Patient Problems





Bereavement (Acute)


Depression (Acute)


Fatigue (Acute)


Essential hypertension (Chronic)








Plan: 








Bereavement (Acute)Depression (Acute)Fatigue (Acute)  She expresses symptoms of 

fatigue, but volunteered no other focal symptoms.  She is adjusting to her new 

psychopharmacology.  She is medically ready for transfer to the Behavioral 

Health Unit when a bed becomes available


Essential hypertension (Chronic)  Acceptable BP at present.

## 2017-10-16 RX ADMIN — QUETIAPINE SCH MG: 25 TABLET, FILM COATED ORAL at 20:22

## 2017-10-16 RX ADMIN — AMLODIPINE BESYLATE SCH: 5 TABLET ORAL at 08:30

## 2017-10-16 RX ADMIN — Medication SCH: at 12:14

## 2017-10-16 RX ADMIN — POTASSIUM CHLORIDE SCH MEQ: 750 TABLET, FILM COATED, EXTENDED RELEASE ORAL at 12:07

## 2017-10-16 RX ADMIN — HEPARIN SODIUM SCH: 5000 INJECTION INTRAVENOUS; SUBCUTANEOUS at 05:04

## 2017-10-16 RX ADMIN — HEPARIN SODIUM SCH: 5000 INJECTION INTRAVENOUS; SUBCUTANEOUS at 20:20

## 2017-10-16 RX ADMIN — HEPARIN SODIUM SCH: 5000 INJECTION INTRAVENOUS; SUBCUTANEOUS at 13:05

## 2017-10-16 RX ADMIN — SERTRALINE HYDROCHLORIDE SCH: 100 TABLET, FILM COATED ORAL at 08:31

## 2017-10-16 RX ADMIN — TRIAMTERENE AND HYDROCHLOROTHIAZIDE SCH CAP: 25; 37.5 CAPSULE ORAL at 12:07

## 2017-10-16 RX ADMIN — POTASSIUM CHLORIDE SCH MEQ: 750 TABLET, FILM COATED, EXTENDED RELEASE ORAL at 12:13

## 2017-10-16 NOTE — CONSULT
Identification





- Patient Identification


Reason for Psychiatric Consultation: Suicidal Ideation


-: 


Patient is a 82 year old, F admitted on 10/12/17.








- MHU Identification


Employment Status: Disabled


Hx Psychiatric Hospitalization: Yes





History





- Objective


HPI: 





Danae is seen on 4N for follow up as she awaits an available bed on the 

inpatient psychiatric unit.  I understand that she remains neurovegetative and 

depressed over the weekend and did not tolerate 25mg strength quetiapine 

secondary to sedation.  On exam she is calm and polite, speaking clearly and 

spontaneously, albeit softly.  "I've been thinking about being in the light.  It

's something I can't stop obsessing about.  It's opposed to all this darkness."

  I ask if this is a suicidal statement, and she essentially agrees that she 

views death as a blessing that would deliver her from her hardships.  She 

remains delusional that her  is still alive and remains guilty about 

perceived past moral shortcomings.  She has eaten some of the eggs on her 

breakfast plate but staff reports indicate she has poor caloric intake.





Exam


Appearance: Thin Framed


Hygiene: Normal


Grooming: Fairly Well Kept


Psychomotor Activities: Abnormal-Decreased


Exhibits Abnormal Movement: No


Attitude and Relatedness: Cooperative


Eye Contact: Fair





- Speech


Quality: Unpressured


Latencies: Normal


Quantity: Appropriate


Patient's Decription of Mood: "Sad"


Observed Affect: Constricted


Affect Consistent with: Dysphoria


Patient's Thought Process: Coherent


Thought Content: Yes Passive Death Wish, Yes Paranoid Ideation, No Suicidal 

Planning, No Homicidal Ideation


Experiencing Hallucinations: Yes


Type of Hallucinations: Visual: Yes, Auditory: No, Command: No


Level of Consciousness: Lethargic


Orientation: Yes Intact, Yes Orientated to Time, Yes Orientated to Place, Yes 

Orientated to Person


Impulse Control: Tenuous


Insight and Judgement: Poor





Impression





- Impression


Clinical Impression: 





82 y.o. recently  white female with a history of episodic psychotic 

depression and 2-3 historic admissions to the BSU, who is currently admitted to 

medicine for failure to thrive; consulted on secondary to passive SI and 

delusional thoughts that her  is still alive and that Satan is coming to 

take her soul.  


Inpatient DSM-IV Dx: MDD, recurrent, severe with psychotic features


Merits Inpatient Hospitalization: Yes





Problem List





- MHU Problems


Type of Problem: Mood


Status of Problem: Active





Plan





- Treatment Plan


Treatment Plan: 





Patient awaits transfer to BSU, pending female bed availability.  Recommend 

continuation of sertraline 100mg PO qday.  Will lower quetiapine to 12.5mg PO 

qhs.  Psychiatry will continue to follow until transferred to our service.


Continued Medication Management: Different Medication


Medications: 


 Current Medications





Amlodipine Besylate (Norvasc Tab*)  5 mg PO DAILY [facility]


   Last Admin: 10/16/17 08:30 Dose:  Not Given


Heparin Sodium (Porcine) (Heparin Vial(*))  5,000 units SUBCUT Q8HR [facility]


   Last Admin: 10/16/17 05:04 Dose:  Not Given


Potassium Chloride/Sodium Chloride (Ns 0.9% W/ 40 Meq Kcl 1000 Ml*)  1,000 mls 

@ 100 mls/hr IV PER RATE [facility]


   Last Admin: 10/12/17 16:00 Dose:  100 mls/hr


Non Formulary Med* (Magnesium 250 Mg)  1 admin PO DAILY [facility]


Potassium Chloride (Klor Con Er Tab*)  20 meq PO DAILY [facility]


Quetiapine Fumarate (Seroquel Tab*)  12.5 mg PO BEDTIME [facility]


Sertraline HCl (Zoloft*)  100 mg PO DAILY [facility]


   Last Admin: 10/16/17 08:31 Dose:  Not Given


Triamterene/HCTZ (Dyazide Cap*)  1 cap PO DAILY [facility]











- Discharge Plan


Discharge Plan: Inpatient Hospitalization

## 2017-10-17 RX ADMIN — SERTRALINE HYDROCHLORIDE SCH MG: 100 TABLET, FILM COATED ORAL at 12:59

## 2017-10-17 RX ADMIN — HEPARIN SODIUM SCH: 5000 INJECTION INTRAVENOUS; SUBCUTANEOUS at 13:00

## 2017-10-17 RX ADMIN — HEPARIN SODIUM SCH: 5000 INJECTION INTRAVENOUS; SUBCUTANEOUS at 21:05

## 2017-10-17 RX ADMIN — QUETIAPINE SCH MG: 25 TABLET, FILM COATED ORAL at 21:06

## 2017-10-17 RX ADMIN — AMLODIPINE BESYLATE SCH MG: 5 TABLET ORAL at 13:00

## 2017-10-17 RX ADMIN — TRIAMTERENE AND HYDROCHLOROTHIAZIDE SCH CAP: 25; 37.5 CAPSULE ORAL at 12:59

## 2017-10-17 RX ADMIN — POTASSIUM CHLORIDE SCH MEQ: 750 TABLET, FILM COATED, EXTENDED RELEASE ORAL at 13:00

## 2017-10-17 RX ADMIN — HEPARIN SODIUM SCH: 5000 INJECTION INTRAVENOUS; SUBCUTANEOUS at 05:19

## 2017-10-17 RX ADMIN — Medication SCH: at 13:00

## 2017-10-17 NOTE — CONSULT
Identification





- Patient Identification


Reason for Psychiatric Consultation: Suicidal Ideation


-: 


Patient is a 82 year old, F admitted on 10/12/17.








- MHU Identification


Employment Status: Disabled


Hx Psychiatric Hospitalization: Yes





History





- Objective


HPI: 





Danae is seen on 4N for follow up as she awaits an available bed on the 

inpatient psychiatric unit.  She remains depressed and withdrawn, although 

cooperative and sweet on interaction.  The patient is aware that she is pending 

admission to the BSU and expresses a preference to this, over the alternative 

of being transferred to an outside facility.  I spoke with both Dr. Mckee 

and the patient's daughter, Chanell ([phone]), who are mutually in favor of 

waiting for a BSU bed as opposed to seeking transfer to specialty geriatric 

psych care.  The patient remains delusional that her   is still 

alive and is still endorsing passive suicidality.





Exam


Appearance: Thin Framed


Hygiene: Normal


Grooming: Fairly Well Kept


Psychomotor Activities: Abnormal-Decreased


Exhibits Abnormal Movement: No


Attitude and Relatedness: Cooperative


Eye Contact: Fair





- Speech


Quality: Unpressured


Latencies: Normal


Quantity: Appropriate


Patient's Decription of Mood: "Sad"


Observed Affect: Constricted


Affect Consistent with: Dysphoria


Patient's Thought Process: Coherent


Thought Content: Yes Passive Death Wish, Yes Paranoid Ideation, No Suicidal 

Planning, No Homicidal Ideation


Experiencing Hallucinations: Yes


Type of Hallucinations: Visual: Yes, Auditory: No, Command: No


Level of Consciousness: Lethargic


Orientation: Yes Intact, Yes Orientated to Time, Yes Orientated to Place, Yes 

Orientated to Person


Impulse Control: Tenuous


Insight and Judgement: Poor





Impression





- Impression


Clinical Impression: 





82 y.o. recently  white female with a history of episodic psychotic 

depression and 2-3 historic admissions to the BSU, who is currently admitted to 

medicine for failure to thrive; consulted on secondary to passive SI and 

delusional thoughts that her  is still alive and that Satan is coming to 

take her soul.  


Inpatient DSM-IV Dx: MDD, recurrent, severe with psychotic features


Merits Inpatient Hospitalization: Yes





Problem List





- MHU Problems


Type of Problem: Mood


Status of Problem: Active





Plan





- Treatment Plan


Treatment Plan: 





Patient awaits transfer to BSU, pending female bed availability.  Recommend 

continuation of sertraline 100mg PO qday and quetiapine 12.5mg PO qhs.  

Psychiatry will continue to follow until transferred to our service.


Continued Medication Management: Start Medication


Medications: 


 Current Medications





Amlodipine Besylate (Norvasc Tab*)  5 mg PO DAILY [facility]


   Last Admin: 10/17/17 13:00 Dose:  5 mg


Heparin Sodium (Porcine) (Heparin Vial(*))  5,000 units SUBCUT Q8HR [facility]


   Last Admin: 10/17/17 13:00 Dose:  Not Given


Potassium Chloride/Sodium Chloride (Ns 0.9% W/ 40 Meq Kcl 1000 Ml*)  1,000 mls 

@ 100 mls/hr IV PER RATE [facility]


   Last Admin: 10/12/17 16:00 Dose:  100 mls/hr


Non Formulary Med* (Magnesium 250 Mg)  1 admin PO DAILY [facility]


   Last Admin: 10/17/17 13:00 Dose:  Not Given


Potassium Chloride (Klor Con Er Tab*)  20 meq PO DAILY [facility]


   Last Admin: 10/17/17 13:00 Dose:  20 meq


Quetiapine Fumarate (Seroquel Tab*)  12.5 mg PO BEDTIME [facility]


   Last Admin: 10/16/17 20:22 Dose:  12.5 mg


Sertraline HCl (Zoloft*)  100 mg PO DAILY [facility]


   Last Admin: 10/17/17 12:59 Dose:  100 mg


Triamterene/HCTZ (Dyazide Cap*)  1 cap PO DAILY [facility]


   Last Admin: 10/17/17 12:59 Dose:  1 cap











- Discharge Plan


Discharge Plan: Inpatient Hospitalization

## 2017-10-17 NOTE — RAD
INDICATION: Hypertension     



COMPARISON: None

 

TECHNIQUE: PA and lateral dual-energy views were obtained.



FINDINGS: 



Bones/Soft Tissues: There are no acute bony findings.    



Cardiomediastinal: The cardiomediastinal silhouette is normal. 



Lungs: There are no infiltrates.



Pleura: There are no pleural effusions. 



Other: None



IMPRESSION:  NO ACTIVE DISEASE.

## 2017-10-18 RX ADMIN — IBUPROFEN PRN MG: 400 TABLET ORAL at 10:37

## 2017-10-18 RX ADMIN — SERTRALINE HYDROCHLORIDE SCH MG: 100 TABLET, FILM COATED ORAL at 10:00

## 2017-10-18 RX ADMIN — TRIAMTERENE AND HYDROCHLOROTHIAZIDE SCH CAP: 25; 37.5 CAPSULE ORAL at 10:01

## 2017-10-18 RX ADMIN — HEPARIN SODIUM SCH: 5000 INJECTION INTRAVENOUS; SUBCUTANEOUS at 04:39

## 2017-10-18 RX ADMIN — HEPARIN SODIUM SCH: 5000 INJECTION INTRAVENOUS; SUBCUTANEOUS at 22:21

## 2017-10-18 RX ADMIN — HEPARIN SODIUM SCH: 5000 INJECTION INTRAVENOUS; SUBCUTANEOUS at 13:24

## 2017-10-18 RX ADMIN — Medication SCH: at 10:01

## 2017-10-18 RX ADMIN — AMLODIPINE BESYLATE SCH MG: 5 TABLET ORAL at 10:00

## 2017-10-18 RX ADMIN — POTASSIUM CHLORIDE SCH MEQ: 750 TABLET, FILM COATED, EXTENDED RELEASE ORAL at 10:00

## 2017-10-18 NOTE — CONSULT
Identification





- Patient Identification


Reason for Psychiatric Consultation: Suicidal Ideation


-: 


Patient is a 82 year old, F admitted on 10/12/17.








- MHU Identification


Employment Status: Disabled


Hx Psychiatric Hospitalization: Yes





History





- Objective


HPI: 





Danae is seen on 4N for follow up as she awaits an available bed on the 

inpatient psychiatric unit.  She remains depressed and withdrawn and complains 

today of sleep disturbance with early morning awakening. "The medicine you're 

giving me at night helps me fall asleep but it wears off and I'm up all night."

  She is highly somatic, complaining of weakness, dizziness and lethargy.  I 

spoke with her her son and daughter in person and updated them on her progress.

  They deny that she ever demonstrated any historical symptoms consistent with 

homero, such as euphoria, hyperverbal speech, increased goal-directed or 

indiscrete behaviors.  Family remains supportive of transfer to BSU pending bed 

availability.





Exam


Appearance: Thin Framed


Hygiene: Normal


Grooming: Fairly Well Kept


Psychomotor Activities: Abnormal-Decreased


Exhibits Abnormal Movement: No


Attitude and Relatedness: Cooperative


Eye Contact: Fair





- Speech


Quality: Unpressured


Latencies: Normal


Quantity: Appropriate


Patient's Decription of Mood: "Sad"


Observed Affect: Constricted


Affect Consistent with: Dysphoria


Patient's Thought Process: Coherent


Thought Content: Yes Passive Death Wish, Yes Paranoid Ideation, No Suicidal 

Planning, No Homicidal Ideation


Experiencing Hallucinations: Yes


Type of Hallucinations: Visual: Yes, Auditory: No, Command: No


Level of Consciousness: Lethargic


Orientation: Yes Intact, Yes Orientated to Time, Yes Orientated to Place, Yes 

Orientated to Person


Impulse Control: Tenuous


Insight and Judgement: Poor





Impression





- Impression


Clinical Impression: 





82 y.o. recently  white female with a history of episodic psychotic 

depression and 2-3 historic admissions to the B[facility], who is currently admitted to 

medicine for failure to thrive; consulted on secondary to passive SI and 

delusional thoughts that her  is still alive and that Satan is coming to 

take her soul.  


Merits Inpatient Hospitalization: Yes





Problem List





- MHU Problems


Type of Problem: Mood


Status of Problem: Active





Plan





- Treatment Plan


Treatment Plan: 





Patient awaits transfer to B[facility], pending female bed availability.  Recommend 

continuation of sertraline 100mg PO qday.  Will increase quetiapine to 25mg PO 

qhs.  Will order PT/OT evals for likely deconditioning and to provide 

behavioral activation.  Psychiatry will continue to follow until transferred to 

our service.


Continued Medication Management: Start Medication


Medications: 


 Current Medications





Acetaminophen (Tylenol Tab*)  650 mg PO Q4H PRN


   PRN Reason: HEADACHE


Amlodipine Besylate (Norvasc Tab*)  5 mg PO DAILY [facility]


   Last Admin: 10/18/17 10:00 Dose:  5 mg


Heparin Sodium (Porcine) (Heparin Vial(*))  5,000 units SUBCUT Q8HR [facility]


   Last Admin: 10/18/17 13:24 Dose:  Not Given


Potassium Chloride/Sodium Chloride (Ns 0.9% W/ 40 Meq Kcl 1000 Ml*)  1,000 mls 

@ 100 mls/hr IV PER RATE [facility]


   Last Admin: 10/12/17 16:00 Dose:  100 mls/hr


Ibuprofen (Motrin Tab*)  400 mg PO Q4H PRN


   PRN Reason: PAIN OR HEADACHE


   Last Admin: 10/18/17 10:37 Dose:  400 mg


Magnesium Hydroxide (Milk Of Magnesia Liq*)  30 ml PO DAILY [facility]


   PRN Reason: CONSTIPATION


Non Formulary Med* (Magnesium 250 Mg)  1 admin PO DAILY [facility]


   Last Admin: 10/18/17 10:01 Dose:  Not Given


Potassium Chloride (Klor Con Er Tab*)  20 meq PO DAILY [facility]


   Last Admin: 10/18/17 10:00 Dose:  20 meq


Sertraline HCl (Zoloft*)  100 mg PO DAILY [facility]


   Last Admin: 10/18/17 10:00 Dose:  100 mg


Triamterene/HCTZ (Dyazide Cap*)  1 cap PO DAILY [facility]


   Last Admin: 10/18/17 10:01 Dose:  1 cap











- Discharge Plan


Discharge Plan: Inpatient Hospitalization

## 2017-10-19 VITALS — SYSTOLIC BLOOD PRESSURE: 133 MMHG | DIASTOLIC BLOOD PRESSURE: 53 MMHG

## 2017-10-19 RX ADMIN — AMLODIPINE BESYLATE SCH MG: 5 TABLET ORAL at 09:45

## 2017-10-19 RX ADMIN — TRIAMTERENE AND HYDROCHLOROTHIAZIDE SCH CAP: 25; 37.5 CAPSULE ORAL at 09:44

## 2017-10-19 RX ADMIN — SERTRALINE HYDROCHLORIDE SCH MG: 100 TABLET, FILM COATED ORAL at 09:44

## 2017-10-19 RX ADMIN — HEPARIN SODIUM SCH: 5000 INJECTION INTRAVENOUS; SUBCUTANEOUS at 05:08

## 2017-10-19 RX ADMIN — IBUPROFEN PRN MG: 400 TABLET ORAL at 15:10

## 2017-10-19 RX ADMIN — POTASSIUM CHLORIDE SCH MEQ: 750 TABLET, FILM COATED, EXTENDED RELEASE ORAL at 09:45

## 2017-10-19 RX ADMIN — HEPARIN SODIUM SCH: 5000 INJECTION INTRAVENOUS; SUBCUTANEOUS at 15:10

## 2017-10-19 RX ADMIN — Medication SCH: at 09:42

## 2017-10-19 NOTE — CONSULT
Identification





- Patient Identification


Reason for Psychiatric Consultation: Suicidal Ideation


-: 


Patient is a 82 year old, F admitted on 10/12/17.








- MHU Identification


Employment Status: Disabled


Hx Psychiatric Hospitalization: Yes





History





- Objective


HPI: 





Danae is seen on 4N for follow up as she awaits an available bed on the 

inpatient psychiatric unit.  She remains depressed and withdrawn with delusions 

and passive SI.  Sleeping slightly better with increased quetiapine.  Patient 

aware of transfer this afternoon to [facility] and agreeable.


Lab Results: 


 Laboratory Tests











  10/19/17





  09:10


 


Urine Color  Yellow


 


Urine Appearance  Cloudy


 


Urine pH  7.0


 


Ur Specific Gravity  1.014


 


Urine Protein  Negative


 


Urine Ketones  Negative


 


Urine Blood  Negative


 


Urine Nitrate  Negative


 


Urine Bilirubin  Negative


 


Urine Urobilinogen  Negative


 


Ur Leukocyte Esterase  Negative


 


Urine Glucose  Negative














Exam


Appearance: Thin Framed


Hygiene: Normal


Grooming: Fairly Well Kept


Psychomotor Activities: Abnormal-Decreased


Exhibits Abnormal Movement: No


Attitude and Relatedness: Cooperative


Eye Contact: Fair





- Speech


Quality: Unpressured


Latencies: Normal


Quantity: Appropriate


Patient's Decription of Mood: "Sad"


Observed Affect: Constricted


Affect Consistent with: Dysphoria


Patient's Thought Process: Coherent


Thought Content: Yes Passive Death Wish, Yes Paranoid Ideation, No Suicidal 

Planning, No Homicidal Ideation


Experiencing Hallucinations: Yes


Type of Hallucinations: Visual: Yes, Auditory: No, Command: No


Level of Consciousness: Lethargic


Orientation: Yes Intact, Yes Orientated to Time, Yes Orientated to Place, Yes 

Orientated to Person


Impulse Control: Tenuous


Insight and Judgement: Poor





Impression





- Impression


Clinical Impression: 





82 y.o. recently  white female with a history of episodic psychotic 

depression and 2-3 historic admissions to the [facility], who is currently admitted to 

medicine for failure to thrive; consulted on secondary to passive SI and 

delusional thoughts that her  is still alive and that Satan is coming to 

take her soul.  


Merits Inpatient Hospitalization: Yes





Problem List





- MHU Problems


Type of Problem: Mood


Status of Problem: Active





Plan





- Treatment Plan


Treatment Plan: 





Transfer today to [facility].


Continued Medication Management: Start Medication


Medications: 


 Current Medications





Acetaminophen (Tylenol Tab*)  650 mg PO Q4H PRN


   PRN Reason: HEADACHE


Amlodipine Besylate (Norvasc Tab*)  5 mg PO DAILY [facility]


   Last Admin: 10/19/17 09:45 Dose:  5 mg


Heparin Sodium (Porcine) (Heparin Vial(*))  5,000 units SUBCUT Q8HR [facility]


   Last Admin: 10/19/17 05:08 Dose:  Not Given


Potassium Chloride/Sodium Chloride (Ns 0.9% W/ 40 Meq Kcl 1000 Ml*)  1,000 mls 

@ 100 mls/hr IV PER RATE [facility]


   Last Admin: 10/12/17 16:00 Dose:  100 mls/hr


Ibuprofen (Motrin Tab*)  400 mg PO Q4H PRN


   PRN Reason: PAIN OR HEADACHE


   Last Admin: 10/18/17 10:37 Dose:  400 mg


Magnesium Hydroxide (Milk Of Magnesia Liq*)  30 ml PO DAILY PRN


   PRN Reason: CONSTIPATION


Non Formulary Med* (Magnesium 250 Mg)  1 admin PO DAILY [facility]


   Last Admin: 10/19/17 09:42 Dose:  Not Given


Potassium Chloride (Klor Con Er Tab*)  20 meq PO DAILY [facility]


   Last Admin: 10/19/17 09:45 Dose:  20 meq


Quetiapine Fumarate (Seroquel Tab*)  25 mg PO BEDTIME [facility]


   Last Admin: 10/18/17 21:14 Dose:  25 mg


Sertraline HCl (Zoloft*)  100 mg PO DAILY [facility]


   Last Admin: 10/19/17 09:44 Dose:  100 mg


Triamterene/HCTZ (Dyazide Cap*)  1 cap PO DAILY [facility]


   Last Admin: 10/19/17 09:44 Dose:  1 cap











- Discharge Plan


Discharge Plan: Inpatient Hospitalization

## 2019-05-31 ENCOUNTER — HOSPITAL ENCOUNTER (INPATIENT)
Dept: HOSPITAL 25 - ED | Age: 84
LOS: 3 days | DRG: 25 | End: 2019-06-03
Attending: INTERNAL MEDICINE | Admitting: PHYSICIAN ASSISTANT
Payer: MEDICARE

## 2019-05-31 DIAGNOSIS — Z88.8: ICD-10-CM
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
## 2019-05-31 DIAGNOSIS — S06.1X0A: ICD-10-CM

## 2019-05-31 DIAGNOSIS — S06.5X0A: Primary | ICD-10-CM

## 2019-05-31 DIAGNOSIS — F32.89: ICD-10-CM

## 2019-05-31 DIAGNOSIS — C95.00: ICD-10-CM
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
## 2019-05-31 DIAGNOSIS — C91.10: ICD-10-CM

## 2019-05-31 DIAGNOSIS — D46.9: ICD-10-CM

## 2019-05-31 DIAGNOSIS — M85.80: ICD-10-CM

## 2019-05-31 DIAGNOSIS — Z66: ICD-10-CM

## 2019-05-31 DIAGNOSIS — Z51.5: ICD-10-CM

## 2019-05-31 DIAGNOSIS — I31.3: ICD-10-CM

## 2019-05-31 DIAGNOSIS — I10: ICD-10-CM

## 2019-05-31 DIAGNOSIS — F41.9: ICD-10-CM

## 2019-05-31 DIAGNOSIS — Z88.0: ICD-10-CM

## 2019-05-31 PROCEDURE — 99284 EMERGENCY DEPT VISIT MOD MDM: CPT

## 2019-05-31 PROCEDURE — 84484 ASSAY OF TROPONIN QUANT: CPT

## 2019-05-31 PROCEDURE — 93005 ELECTROCARDIOGRAM TRACING: CPT

## 2019-05-31 PROCEDURE — C1713 ANCHOR/SCREW BN/BN,TIS/BN: HCPCS

## 2019-05-31 PROCEDURE — 85025 COMPLETE CBC W/AUTO DIFF WBC: CPT

## 2019-05-31 PROCEDURE — 94003 VENT MGMT INPAT SUBQ DAY: CPT

## 2019-05-31 PROCEDURE — 86922 COMPATIBILITY TEST ANTIGLOB: CPT

## 2019-05-31 PROCEDURE — 86900 BLOOD TYPING SEROLOGIC ABO: CPT

## 2019-05-31 PROCEDURE — P9040 RBC LEUKOREDUCED IRRADIATED: HCPCS

## 2019-05-31 PROCEDURE — 86850 RBC ANTIBODY SCREEN: CPT

## 2019-05-31 PROCEDURE — 80048 BASIC METABOLIC PNL TOTAL CA: CPT

## 2019-05-31 PROCEDURE — 85014 HEMATOCRIT: CPT

## 2019-05-31 PROCEDURE — 36415 COLL VENOUS BLD VENIPUNCTURE: CPT

## 2019-05-31 PROCEDURE — 86901 BLOOD TYPING SEROLOGIC RH(D): CPT

## 2019-05-31 PROCEDURE — 71045 X-RAY EXAM CHEST 1 VIEW: CPT

## 2019-05-31 PROCEDURE — 84443 ASSAY THYROID STIM HORMONE: CPT

## 2019-05-31 PROCEDURE — 94002 VENT MGMT INPAT INIT DAY: CPT

## 2019-05-31 PROCEDURE — 74176 CT ABD & PELVIS W/O CONTRAST: CPT

## 2019-05-31 PROCEDURE — C1776 JOINT DEVICE (IMPLANTABLE): HCPCS

## 2019-05-31 PROCEDURE — 87641 MR-STAPH DNA AMP PROBE: CPT

## 2019-05-31 PROCEDURE — 83605 ASSAY OF LACTIC ACID: CPT

## 2019-05-31 PROCEDURE — 70450 CT HEAD/BRAIN W/O DYE: CPT

## 2019-05-31 PROCEDURE — 80053 COMPREHEN METABOLIC PANEL: CPT

## 2019-05-31 PROCEDURE — 82803 BLOOD GASES ANY COMBINATION: CPT

## 2019-05-31 PROCEDURE — 85018 HEMOGLOBIN: CPT

## 2019-05-31 PROCEDURE — 71250 CT THORAX DX C-: CPT

## 2019-05-31 PROCEDURE — 72125 CT NECK SPINE W/O DYE: CPT

## 2019-05-31 PROCEDURE — 85060 BLOOD SMEAR INTERPRETATION: CPT

## 2019-05-31 NOTE — ED
Head Injury





- HPI Summary


HPI Summary: 





85 yo female presents to [facility] [place] via ambulance s/p fall. Pt tells me that she 

lives alone at [place]. She is unable to tell me when she fell, but states it 

was sometime after dinner around 1700. She remembers getting up from bed, but 

the next thing she remembers is waking up on the floor. Unsure how long she was 

on the floor. She called for assistance and [facility] members called an 

ambulance for her. She sustained an abrasion to her right scapula. She says 

that she has a headache and right hip pain currently and is asking for tylenol. 

She says that she was unable to walk s/p fall because her "legs gave out". She 

denies dizziness, vision changes, SOB, chest pain, abdominal pain, vomiting. 

PMHx CLL and HTN.





- History Of Current Complaint


Stated Complaint: "FALL" PER EMS


Time Seen by Provider: 05/31/19 23:49


Hx Obtained From: Patient


Hx Last Menstrual Period: N/A


Severity Currently: Moderate


Severity Initially: Moderate


Pain Intensity: 5


Pain Scale Used: 0-10 Numeric





- Allergies/Home Medications


Allergies/Adverse Reactions: 


 Allergies











Allergy/AdvReac Type Severity Reaction Status Date / Time


 


fluconazole Allergy  Unknown Verified 06/01/19 01:34





   Reaction  





   Details  


 


Penicillins Allergy  Unknown Verified 06/01/19 01:34





   Reaction  





   Details  











Home Medications: 


 Home Medications





Aspirin [Aspirin EC] 81 mg PO DAILY 06/01/19 [History Confirmed 06/01/19]


Cholecalciferol TAB* [Vitamin D TAB*] 1 tab PO DAILY 06/01/19 [History 

Confirmed 06/01/19]


Lisinopril [Lisinopril 2.5 MG-] 2.5 mg PO DAILY 06/01/19 [History Confirmed 06/ 01/19]











PMH/Surg Hx/FS Hx/Imm Hx


Endocrine/Hematology History: 


   Denies: Hx Diabetes, Hx Thyroid Disease


Cardiovascular History: Reports: Hx Angina - pt reports, Hx Hypertension, Other 

Cardiovascular Problems/Disorders - pt reports an arrythmia


   Denies: Hx Pacemaker/ICD


Respiratory History: 


   Denies: Hx Asthma, Hx Chronic Obstructive Pulmonary Disease (COPD)


GI History: Reports: Hx Hiatal Hernia - pt reports


   Denies: Hx Ulcer


Musculoskeletal History: 


   Denies: Hx Scoliosis


Sensory History: Reports: Hx Contacts or Glasses - glasses w/ pt


   Denies: Hx Hearing Aid


Opthamlomology History: Reports: Hx Contacts or Glasses - glasses w/ pt


Neurological History: Reports: Hx Headaches


   Denies: Other Neuro Impairments/Disorders


Psychiatric History: Reports: Hx Anxiety, Hx Depression, Hx Panic Disorder, Hx 

Inpatient Treatment - pt reports prior admission here twice, Hx Community 

Mental Health Tx - past, Hx Suicide Attempt - pt reports "years ago"


   Denies: Hx Attention Deficit Hyperactivity Disorder, Hx Eating Disorder, Hx 

Post Traumatic Stress Disorder, Hx Schizophrenia, Hx Bipolar Disorder, Hx of 

Violent Episodes Against Others, Hx Substance Abuse





- Cancer History


Cancer Type, Location and Year: CLL


Hx Chemotherapy: No


Hx Radiation Therapy: No





- Surgical History


Surgery Procedure, Year, and Place: LEFT BREAST LUMPECTOMY 1958





- Immunization History


Date of Tetanus Vaccine: utd


Date of Influenza Vaccine: utd


Infectious Disease History: 


   Denies: Hx Hepatitis, Hx Human Immunodeficiency Virus (HIV)





- Family History


Known Family History: Positive: Cardiac Disease, Diabetes - brother -weight 

related, Other - breast ca





- Social History


Alcohol Use: Rare


Alcohol Amount: pt reports drinking "a few ounces of wine with meals"


Substance Use Type: Reports: None


Smoking Status (MU): Never Smoked Tobacco


Length of Time of Smoking/Using Tobacco: never smoked


Have You Smoked in the Last Year: No





Review of Systems


Constitutional: Negative


Eyes: Negative


ENT: Negative


Cardiovascular: Negative


Respiratory: Negative


Gastrointestinal: Negative


Genitourinary: Negative


Musculoskeletal: Other - Right hip pain. Head pain. Back pain.


Positive: Other - Abrasion right scapula


Neurological: Negative


Psychological: Normal


All Other Systems Reviewed And Are Negative: Yes





Physical Exam





- Summary


Physical Exam Summary: 





GENERAL: NAD. WDWN. No pain distress.


SKIN: Right scapula with 2cm superficial abrasion.


HEENT:


            Head: AT/NC. No raccoon eyes or battles sign.


            Eyes: PERRLA. EOM intact. 


            Ears: Hearing grossly normal. No hemotympanum


NECK: Supple. FROM. TTP cervical spine.


CHEST:  CTAB. No r/r/w. No accessory muscle use. Breathing comfortably and in 

no distress.


CV:  RRR. Pulses intact. Brisk cap refill.


ABDOMEN:  Soft. NTTP. Bowel sounds present


MSK: RIGHT HIP: TTP. No obvious deformity. Unable to flex hip on right due to 

pain. FROM in B/L UEs with symmetric strength.


NEURO: A&Ox3. Ability to follow 2-step directions, and attention intact. CN: II

: Peripheral fields intact. Vision normal. III, IV, VI: EOMI. No nystagmus. 

PERRLA. V: Sensations intact and symmetric. Opens mouth and clenches teeth. VII

: No facial asymmetry. Forehead wrinkles. Grins, shuts eyes, frowns, puffs 

cheeks. VIII: Hearing intact to finger rub. IX, X: Swallows and coughs. Uvula 

midline. XI: Shrugs shoulders. Turns head against resistance. XII: No tongue 

deviation Finger-to-nose are intact. Normal speech. No facial drooping.


PSYCH: Age appropriate behavior.


Triage Information Reviewed: Yes


Vital Signs On Initial Exam: 





Vital Signs:











Temp Pulse Resp BP Pulse Ox


 


 98.0 F   108   20   179/107   92 


 


 05/31/19 23:50  05/31/19 23:50  05/31/19 23:50  05/31/19 23:50  05/31/19 23:50











Vital Signs Reviewed: Yes





Diagnostics





- Laboratory


Result Diagrams: 


 06/01/19 06:16





 06/01/19 06:16


Lab Statement: Any lab studies that have been ordered have been reviewed, and 

results considered in the medical decision making process.





National Institutes Of Health





- NIH Scale


Level of Consciousness: Alert/Keenly Responsive


Ask Patient the Month and His/Her Age: Both Correct


Ask Pt to Open/Close Eyes and /Release Non-Paretic Hand: Both Correctly


Best Gaze (Only Horizontal Eye Movement): Normal


Visual Field Testing: No Visual Loss


Facial Paresis-Pt to Smile & Close Eyes or Grimace Symmetry: Normal/Symmetrical


Motor Function - Right Arm: No Drift-Holds 10 Seconds


Motor Function - Left Arm: No Drift-Holds 10 Seconds


Motor Function - Right Leg: Effort Against Gravity - due to pain


Motor Function - Left Leg: No Drift-Holds 10 Seconds


Limb Ataxia-Must be out of Proportion to Weakness Present: Absent


Sensory (Use Pinprick to Test Arms/Legs/Trunk/Face): Normal


Best Language (Describe Picture, Name Items): No Aphasia


Dysarthria (Read Several Words): Normal


Extinction and Inattention: No Abnormality


Total Score: 2





Re-Evaluation





- Re-Evaluation


  ** First Eval


Re-Evaluation Time: 01:30


Change: Unchanged


Comment: Call from [facility]:IMPRESSION:  There is a right subdural hematoma 

measuring up to 15 mm in thickness with.  mixed primarily intermediate 

attenuation components overlying the frontal,.  temporal and parietal lobes. 

There is associated midline shift right to left of.  7 mm at the level of the 

mildly distorted ventricles.  ASSESSMENT:  ASPECTS (Alberta Stroke Program 

Early CT Score) is 10.  Call to Dr. Poe of neurosurgery at 0140.  Alerted 

by RN that pt O2% down to 86% on RA. Placed on 2L and O2% up to 94%.  Return 

call shortly after by Sherin HINES with Dr. Poe, they will see pt in ED. 

Advised to give 1gm Keppra, make NPO, and keep SBP <160.





  ** Second Eval


Re-Evaluation Time: 02:04


Change: Unchanged


Comment: Spoke with Chanell Ferreira, pt's daughter and emergency contact on file. 

Chanell said that she took a sleeping pill in the evening and is unable to come in

, but wishes to honor mother's wishes and what ever she (pt) wants to do. I 

made her aware pt will likely need surgery emergently and, given her condition, 

may deteriorate quickly prior to this. Strongly recommend Chanell come to the ED. 

Chanell voiced understanding





  ** Third Eval


Re-Evaluation Time: 02:15


Change: Worse


Comment: Pt vomiting. Diaphoretic. Zofran ordered. Noted to be drowsy





  ** Fourth Eval


Re-Evaluation Time: 02:20


Change: Unchanged


Comment: Neurosurgery FLORA Duff into eval pt





  ** Fifth Eval


Re-Evaluation Time: 02:45


Change: Unchanged


Comment: Dr. Kelley anesthesiologist in to see pt for emergent surgery by Dr. Poe. Verbal consent obtain from daughter Chanell via phone. I witnessed. All 

questions answered.





Head Injury Course/Dx


Course Of Treatment: NIH score 2 - points due to right hip pain and inability 

to hold extremity.  EKG: Sinus tachycardia 110bpm without ST changes as read by 

Dr. Mann.  CT brain call from [facility]: IMPRESSION:  There is a right subdural 

hematoma measuring up to 15 mm in thickness with.  mixed primarily intermediate 

attenuation components overlying the frontal,.  temporal and parietal lobes. 

There is associated midline shift right to left of.  7 mm at the level of the 

mildly distorted ventricles.  ASSESSMENT:  ASPECTS (Alberta Stroke Program 

Early CT Score) is 10.  CT cervical spine: IMPRESSION:  No acute fracture or 

subluxation in the cervical spine.  CT chest/abd/pelvis : IMPRESSION:  1. 

Evaluation of parenchymal organs is limited by lack of intravenous contrast.  

as well as scatter artifact. No gross laceration or suspicious mass.  2. 

Enlarged liver.  3. Minimal free fluid.  4. Osteopenia and moderate to severe 

skeletal degenerative change. Degenerative.  scoliosis. No acute fracture is 

seen although motion artifact is problematic at.  some levels.  5. No other 

acute disease seen on nonenhanced study. As Above.  Upon call from [facility] as above

, neurosurgery was contacted and advised to give 1gm Keppra and target SBP <

160. Pt's BP at that time was noted to be elevated at 207/118 compared to 

initial presentation (179/107). Given her low BMI and frailty she was given 

Labetalol 5mg and BP lowered to 174/97 with HR in the 80s. She was given 

another 5mg of Labetalol and tagert BP was achieved at 153/97 with HR remaining 

in the 70s-80s. Sherin HINES and Dr. Poe at bedside to eval pt and planned for 

emergent evacuation of SDH. I spoke to Chanell (daughter) with Dr. Kelley of 

anesthesia and obtained verbal consent for anesthesia. Risks were made aware to 

daughter and all questions answered. Just prior to leaving for the OR pt was 

significantly drowsy, but arousable and opened eyes when prompted. Answered yes 

or no to most questions. States no pain. Pt to OR.





- Diagnoses


Provider Diagnoses: 


 Subdural hematoma








- Physician Notifications


Discussed Care Of Patient With: Dylan Poe - FLORA Duff. 


Time Discussed With Above Provider: 01:59 - Systolic <160. NPO. Keppra 1gm. 

Likely surgery now or in the morning. 


Instructed by Provider To: MD Will See In ED





- Critical Care Time


Critical Care Time:  min





Discharge





- Sign-Out/Discharge


Documenting (check all that apply): Patient Departure


All imaging exams completed and their final reports reviewed: Yes


Patient Received Moderate/Deep Sedation with Procedure: No





- Discharge Plan


Condition: Guarded


Disposition: ADMITTED TO [facility]





- Billing Disposition and Condition


Condition: GUARDED


Disposition: Admitted to [facility]

## 2019-06-01 LAB
ALBUMIN SERPL BCG-MCNC: 4.1 G/DL (ref 3.2–5.2)
ALBUMIN/GLOB SERPL: 2.2 {RATIO} (ref 1–3)
ALP SERPL-CCNC: 94 U/L (ref 34–104)
ALT SERPL W P-5'-P-CCNC: 15 U/L (ref 7–52)
ANION GAP SERPL CALC-SCNC: 10 MMOL/L (ref 2–11)
ANION GAP SERPL CALC-SCNC: 8 MMOL/L (ref 2–11)
AST SERPL-CCNC: 22 U/L (ref 13–39)
BUN SERPL-MCNC: 38 MG/DL (ref 6–24)
BUN SERPL-MCNC: 40 MG/DL (ref 6–24)
BUN/CREAT SERPL: 28 (ref 8–20)
BUN/CREAT SERPL: 28.6 (ref 8–20)
CALCIUM SERPL-MCNC: 7.2 MG/DL (ref 8.6–10.3)
CALCIUM SERPL-MCNC: 8.9 MG/DL (ref 8.6–10.3)
CHLORIDE SERPL-SCNC: 106 MMOL/L (ref 101–111)
CHLORIDE SERPL-SCNC: 109 MMOL/L (ref 101–111)
GLOBULIN SER CALC-MCNC: 1.9 G/DL (ref 2–4)
GLUCOSE SERPL-MCNC: 182 MG/DL (ref 70–100)
GLUCOSE SERPL-MCNC: 223 MG/DL (ref 70–100)
HCO3 SERPL-SCNC: 17 MMOL/L (ref 22–32)
HCO3 SERPL-SCNC: 18 MMOL/L (ref 22–32)
HCT VFR BLD AUTO: 27 % (ref 35–47)
HCT VFR BLD AUTO: 30 % (ref 35–47)
HCT VFR BLD AUTO: 40 % (ref 35–47)
HGB BLD-MCNC: 11.7 G/DL (ref 12–16)
HGB BLD-MCNC: 8 G/DL (ref 12–16)
HGB BLD-MCNC: 9.2 G/DL (ref 12–16)
MCH RBC QN AUTO: 21 PG (ref 27–31)
MCH RBC QN AUTO: 21 PG (ref 27–31)
MCHC RBC AUTO-ENTMCNC: 30 G/DL (ref 31–36)
MCHC RBC AUTO-ENTMCNC: 30 G/DL (ref 31–36)
MCV RBC AUTO: 71 FL (ref 80–97)
MCV RBC AUTO: 71 FL (ref 80–97)
NEUTROPHILS # BLD AUTO: 60.1 10^3/UL (ref 1.5–7.7)
PLATELET # BLD AUTO: 2459 10^3/UL (ref 150–450)
PLATELET # BLD AUTO: 3593 10^3/UL (ref 150–450)
POLYCHROMASIA BLD QL SMEAR: (no result)
POLYCHROMASIA BLD QL SMEAR: (no result)
POTASSIUM SERPL-SCNC: 4.8 MMOL/L (ref 3.5–5)
POTASSIUM SERPL-SCNC: 4.9 MMOL/L (ref 3.5–5)
PROT SERPL-MCNC: 6 G/DL (ref 6.4–8.9)
RBC # BLD AUTO: 3.78 10^6 /UL (ref 3.7–4.87)
RBC # BLD AUTO: 5.58 10^6 /UL (ref 3.7–4.87)
SODIUM SERPL-SCNC: 134 MMOL/L (ref 135–145)
SODIUM SERPL-SCNC: 134 MMOL/L (ref 135–145)
TROPONIN I SERPL-MCNC: 0.05 NG/ML (ref ?–0.04)
TSH SERPL-ACNC: 4.09 MCIU/ML (ref 0.34–5.6)
VARIANT LYMPHS # BLD MANUAL: 12 % (ref 0–6)
WBC # BLD AUTO: 80.4 10^3/UL (ref 3.5–10.8)
WBC # BLD AUTO: 86.3 10^3/UL (ref 3.5–10.8)

## 2019-06-01 PROCEDURE — 5A1945Z RESPIRATORY VENTILATION, 24-96 CONSECUTIVE HOURS: ICD-10-PCS | Performed by: NEUROLOGICAL SURGERY

## 2019-06-01 PROCEDURE — 30233N1 TRANSFUSION OF NONAUTOLOGOUS RED BLOOD CELLS INTO PERIPHERAL VEIN, PERCUTANEOUS APPROACH: ICD-10-PCS | Performed by: NEUROLOGICAL SURGERY

## 2019-06-01 PROCEDURE — 0BH17EZ INSERTION OF ENDOTRACHEAL AIRWAY INTO TRACHEA, VIA NATURAL OR ARTIFICIAL OPENING: ICD-10-PCS | Performed by: NEUROLOGICAL SURGERY

## 2019-06-01 PROCEDURE — 00B70ZZ EXCISION OF CEREBRAL HEMISPHERE, OPEN APPROACH: ICD-10-PCS | Performed by: NEUROLOGICAL SURGERY

## 2019-06-01 PROCEDURE — 0W9100Z DRAINAGE OF CRANIAL CAVITY WITH DRAINAGE DEVICE, OPEN APPROACH: ICD-10-PCS | Performed by: NEUROLOGICAL SURGERY

## 2019-06-01 RX ADMIN — HYDRALAZINE HYDROCHLORIDE PRN MG: 20 INJECTION INTRAMUSCULAR; INTRAVENOUS at 20:03

## 2019-06-01 RX ADMIN — CHLORHEXIDINE GLUCONATE 0.12% ORAL RINSE SCH ML: 1.2 LIQUID ORAL at 20:04

## 2019-06-01 RX ADMIN — SODIUM CHLORIDE, SODIUM LACTATE, POTASSIUM CHLORIDE, AND CALCIUM CHLORIDE SCH MLS/HR: 600; 310; 30; 20 INJECTION, SOLUTION INTRAVENOUS at 21:19

## 2019-06-01 RX ADMIN — CHLORHEXIDINE GLUCONATE 0.12% ORAL RINSE SCH ML: 1.2 LIQUID ORAL at 23:04

## 2019-06-01 RX ADMIN — PROPOFOL SCH MLS/HR: 10 INJECTION, EMULSION INTRAVENOUS at 19:36

## 2019-06-01 RX ADMIN — LEVETIRACETAM SCH MLS/HR: 5 INJECTION INTRAVENOUS at 17:21

## 2019-06-01 RX ADMIN — HYDRALAZINE HYDROCHLORIDE PRN MG: 20 INJECTION INTRAMUSCULAR; INTRAVENOUS at 09:30

## 2019-06-01 RX ADMIN — CHLORHEXIDINE GLUCONATE 0.12% ORAL RINSE SCH ML: 1.2 LIQUID ORAL at 14:45

## 2019-06-01 RX ADMIN — FAMOTIDINE SCH MG: 10 INJECTION, SOLUTION INTRAVENOUS at 09:30

## 2019-06-01 RX ADMIN — PROPOFOL SCH MLS/HR: 10 INJECTION, EMULSION INTRAVENOUS at 07:20

## 2019-06-01 NOTE — PN
Date of Service: 06/01/19


Vital Signs: 











Temp Pulse Resp BP SpO2 FiO2


 


96.8 F 86 18 121/57 99 45


 


06/01/19 07:17 06/01/19 11:00 06/01/19 08:00 06/01/19 11:00 06/01/19 11:00 06/01 /19 09:03











Physical Exam: 


Gen: intubated. sedated. not following








Lungs: Decreased BS's, Not using accessory muscles 





Cardiac:  RRR





Abdomen:  + BS's, Soft, NTP 





Extremities: No MATTHEW





Neuro: Not following





Fluid Balance (Past 24 Hours): 


I=     O=     Net 


 Intake & Output











 05/30/19 05/31/19 06/01/19 06/02/19





 06:59 06:59 06:59 06:59


 


Intake Total   100 


 


Output Total    315


 


Balance   100 -315


 


Weight   90 lb 115 lb 11.883 oz


 


Intake:    


 


  IV Fluids   100 


 


    CLIJNDAMYCIN 900MG   100 


 


Output:    


 


  VEL #1    85


 


  Cranial Drain    17


 


  [place]ard    213





 





A








Labs: 


 Laboratory Results - last 24 hr











  06/01/19 06/01/19 06/01/19





  00:19 00:19 00:19


 


WBC  86.3 H  


 


RBC  5.58 H  


 


Hgb  11.7 L  


 


Hct  40  


 


MCV  71 L  


 


MCH  21 L  


 


MCHC  30 L  


 


RDW  21 H  


 


Plt Count  3593 H  


 


MPV  8.3  


 


Neut % (Auto)  Not Reportable  


 


Lymph % (Auto)  Not Reportable  


 


Mono % (Auto)  Not Reportable  


 


Eos % (Auto)  Not Reportable  


 


Baso % (Auto)  Not Reportable  


 


Absolute Neuts (auto)  Not Reportable  


 


Absolute Lymphs (auto)  Not Reportable  


 


Absolute Monos (auto)  Not Reportable  


 


Absolute Eos (auto)  Not Reportable  


 


Absolute Basos (auto)  Not Reportable  


 


Absolute Nucleated RBC  Not Reportable  


 


Immature Gran %  11.0 H  


 


Neutrophils %  60.0  


 


Band Neutrophils %  11.0 H  


 


Lymphocytes %  10.0  


 


Reactive Lymphs %  12.0 H  


 


Monocytes %  5.0  


 


Eosinophils %  2.0  


 


Basophils %   


 


Metamyelocytes %   


 


Blast Cells %   


 


Nucleated RBC %  Not Reportable  


 


Abs Neuts (Manual)  61.2 H  


 


Abs Lymphs (Manual)  19.0 H  


 


Abs Monocytes (Manual)  4.3 H  


 


Absolute Eos (Manual)  1.7 H  


 


Abs Basophils (Manual)   


 


Normal RBC Morphology  Not Reportable  


 


Polychromasia  1+  


 


Anisocytosis   


 


Macrocytosis  1+  


 


Patient Temperature   


 


ABG pH   


 


ABG pH (Temp Correct)   


 


ABG pCO2   


 


ABG pCO2 (Temp Corrct   


 


ABG pO2   


 


ABG pO2 (Temp Correct   


 


ABG HCO3   


 


ABG O2 Saturation   


 


ABG Base Excess   


 


Respiration Rate   


 


O2 Delivery Device   


 


Ventilator Type   


 


Vent Mode   


 


FiO2   


 


Inspiratory Time   


 


PEEP   


 


Pressure Support   


 


Pressure Control   


 


EPAP   


 


IPAP   


 


BiPAP   


 


Sodium   134 L 


 


Potassium   4.8 


 


Chloride   106 


 


Carbon Dioxide   18 L 


 


Anion Gap   10 


 


BUN   40 H 


 


Creatinine   1.43 H 


 


Est GFR ( Amer)   42.3 


 


Est GFR (Non-Af Amer)   35.0 


 


BUN/Creatinine Ratio   28.0 H 


 


Glucose   182 H 


 


Lactic Acid    1.8


 


Calcium   8.9 


 


Total Bilirubin   0.50 


 


AST   22 


 


ALT   15 


 


Alkaline Phosphatase   94 


 


Troponin I   0.05 H* 


 


Total Protein   6.0 L 


 


Albumin   4.1 


 


Globulin   1.9 L 


 


Albumin/Globulin Ratio   2.2 


 


TSH   4.09 


 


Blood Type   


 


Antibody Screen   


 


Crossmatch   














  06/01/19 06/01/19 06/01/19





  00:19 05:03 06:16


 


WBC   80.4 H 


 


RBC   3.78 


 


Hgb   8.0 L 


 


Hct   27 L 


 


MCV   71 L 


 


MCH   21 L 


 


MCHC   30 L 


 


RDW   21 H 


 


Plt Count   2459 H D 


 


MPV   7.9 


 


Neut % (Auto)   Not Reportable 


 


Lymph % (Auto)   Not Reportable 


 


Mono % (Auto)   Not Reportable 


 


Eos % (Auto)   Not Reportable 


 


Baso % (Auto)   Not Reportable 


 


Absolute Neuts (auto)   60.1 H 


 


Absolute Lymphs (auto)   Not Reportable 


 


Absolute Monos (auto)   Not Reportable 


 


Absolute Eos (auto)   Not Reportable 


 


Absolute Basos (auto)   Not Reportable 


 


Absolute Nucleated RBC   Not Reportable 


 


Immature Gran %   7.0 


 


Neutrophils %   80.0 


 


Band Neutrophils %   2.0 


 


Lymphocytes %   7.0 


 


Reactive Lymphs %   


 


Monocytes %   1.0 


 


Eosinophils %   1.0 


 


Basophils %   0.0 


 


Metamyelocytes %   5.0 H 


 


Blast Cells %   4.0 H* 


 


Nucleated RBC %   Not Reportable 


 


Abs Neuts (Manual)   69.8 H 


 


Abs Lymphs (Manual)   5.6 H 


 


Abs Monocytes (Manual)   0.8 


 


Absolute Eos (Manual)   0.8 H 


 


Abs Basophils (Manual)   0.0 


 


Normal RBC Morphology   Not Reportable 


 


Polychromasia   1+ 


 


Anisocytosis   1+ 


 


Macrocytosis   


 


Patient Temperature   


 


ABG pH    7.18 L*


 


ABG pH (Temp Correct)   


 


ABG pCO2    42


 


ABG pCO2 (Temp Corrct   


 


ABG pO2    160 H


 


ABG pO2 (Temp Correct   


 


ABG HCO3    15.4 L


 


ABG O2 Saturation    99.7 H


 


ABG Base Excess    -12.2 L


 


Respiration Rate   


 


O2 Delivery Device   


 


Ventilator Type   


 


Vent Mode   


 


FiO2   


 


Inspiratory Time   


 


PEEP   


 


Pressure Support   


 


Pressure Control   


 


EPAP   


 


IPAP   


 


BiPAP   


 


Sodium   


 


Potassium   


 


Chloride   


 


Carbon Dioxide   


 


Anion Gap   


 


BUN   


 


Creatinine   


 


Est GFR ( Amer)   


 


Est GFR (Non-Af Amer)   


 


BUN/Creatinine Ratio   


 


Glucose   


 


Lactic Acid   


 


Calcium   


 


Total Bilirubin   


 


AST   


 


ALT   


 


Alkaline Phosphatase   


 


Troponin I   


 


Total Protein   


 


Albumin   


 


Globulin   


 


Albumin/Globulin Ratio   


 


TSH   


 


Blood Type  O Positive  


 


Antibody Screen  Negative  


 


Crossmatch  See Detail  














  06/01/19 06/01/19 06/01/19





  06:16 06:16 07:12


 


WBC   


 


RBC   


 


Hgb  9.2 L  


 


Hct  30 L  


 


MCV   


 


MCH   


 


MCHC   


 


RDW   


 


Plt Count   


 


MPV   


 


Neut % (Auto)   


 


Lymph % (Auto)   


 


Mono % (Auto)   


 


Eos % (Auto)   


 


Baso % (Auto)   


 


Absolute Neuts (auto)   


 


Absolute Lymphs (auto)   


 


Absolute Monos (auto)   


 


Absolute Eos (auto)   


 


Absolute Basos (auto)   


 


Absolute Nucleated RBC   


 


Immature Gran %   


 


Neutrophils %   


 


Band Neutrophils %   


 


Lymphocytes %   


 


Reactive Lymphs %   


 


Monocytes %   


 


Eosinophils %   


 


Basophils %   


 


Metamyelocytes %   


 


Blast Cells %   


 


Nucleated RBC %   


 


Abs Neuts (Manual)   


 


Abs Lymphs (Manual)   


 


Abs Monocytes (Manual)   


 


Absolute Eos (Manual)   


 


Abs Basophils (Manual)   


 


Normal RBC Morphology   


 


Polychromasia   


 


Anisocytosis   


 


Macrocytosis   


 


Patient Temperature    Not Reportable


 


ABG pH    7.28 L


 


ABG pH (Temp Correct)    Not Reportable


 


ABG pCO2    38


 


ABG pCO2 (Temp Corrct    Not Reportable


 


ABG pO2    89


 


ABG pO2 (Temp Correct    Not Reportable


 


ABG HCO3    18.5 L


 


ABG O2 Saturation    99.0 H


 


ABG Base Excess    -8.2 L


 


Respiration Rate    18


 


O2 Delivery Device    Vent


 


Ventilator Type    450


 


Vent Mode    Apvcmv


 


FiO2    50


 


Inspiratory Time    1.00


 


PEEP    5


 


Pressure Support    Not Reportable


 


Pressure Control    Not Reportable


 


EPAP    Not Reportable


 


IPAP    Not Reportable


 


BiPAP    Not Reportable


 


Sodium   134 L 


 


Potassium   4.9 


 


Chloride   109 


 


Carbon Dioxide   17 L 


 


Anion Gap   8 


 


BUN   38 H 


 


Creatinine   1.33 H 


 


Est GFR ( Amer)   46.0 


 


Est GFR (Non-Af Amer)   38.0 


 


BUN/Creatinine Ratio   28.6 H 


 


Glucose   223 H 


 


Lactic Acid   


 


Calcium   7.2 L 


 


Total Bilirubin   


 


AST   


 


ALT   


 


Alkaline Phosphatase   


 


Troponin I   


 


Total Protein   


 


Albumin   


 


Globulin   


 


Albumin/Globulin Ratio   


 


TSH   


 


Blood Type   


 


Antibody Screen   


 


Crossmatch   











Impression: 





84 F with CLL s/p SDH and craniotomy. Patient's wishes were known to PMD and 

family. Patient did not want aggressive medical measures besides being DNR/DNI.

  After PMD had d/w family who is coming from [place] patient will be 

terminally extubated. 











Plan: 


Continue present measures without any escalation of measures


No further scans or blood draws


Terminally extubate when all family together and ready 


Plan d/w PMD. NS aware of plan. 








Critical Care Time: 


35 minutes

## 2019-06-01 NOTE — HP
H&P (Free Text)


History and Physical: 





History and Physical





Date of Admission: 19





CC: Fall 





HPI: This is an 84 year old female with past medical history significant for 

HTN and CLL who presented to [facility] ED with complaint of fall, sustaining injury 

to the head. She is unable to provide a detailed history secondary to 

drowsiness. She does report headache, nausea and tiredness. States that she 

just wants to fall alseep. According to ED provider, when she arrived she was 

alert, oriented and able to answer all questions appropriately, provide 

history. Over the past 2 hours, her mental status has declined and she has 

become less alert. CT brain was obtained showing acute right frontal SDH. She 

has been tachycardic and hypertensive in the ED. She was given labetalol, zofran

, keppra 1000mg and tylenol in the ED. She last ate dinner around 5, denies 

food or drink after. 





Past Medical History:


1. HTN


2. CLL





Past Surgical History:


1. Breast biopsy





Home medications:





Allergies:


1. Penicillins


2. Fluconazole





Social History: This patient lives alone at [place]. Health care proxy is her 

daughter, Chanell [phone]





Physical Exam:


Vital Signs:








Temp Pulse Resp BP Pulse Ox


 


 98.0 F   106   26   185/107   90 


 


 19 23:50  19 00:32  19 01:00  19 00:20  19 00:32








General: Awakes easily to voice, drowsy.





HEENT: Head is normocephalic and atraumatic, pupils equal and sluggish, EOMI





CV: Tachycardia





Neuro: Oriented to person, place, situation. CN II-XII intact. Strength 5/5 

upper and lower extremities bilaterally. Sensation intact throughout. No 

pronator drift.





Extremities: Mild bilateral pedal edema





Imagin. CT brain shows acute right frontal SDH





Assessment and Plan: This is a 84 year old female who presented to [facility] ED after 

a fall and was found to have right frontal SDH on CT brain. Patient will be 

taken urgently to OR for right frontal craniotomy for SDH evacuation.

## 2019-06-02 VITALS — SYSTOLIC BLOOD PRESSURE: 144 MMHG | DIASTOLIC BLOOD PRESSURE: 65 MMHG

## 2019-06-02 RX ADMIN — LORAZEPAM PRN MG: 2 INJECTION INTRAMUSCULAR; INTRAVENOUS at 12:58

## 2019-06-02 RX ADMIN — LEVETIRACETAM SCH MLS/HR: 5 INJECTION INTRAVENOUS at 17:39

## 2019-06-02 RX ADMIN — MORPHINE SULFATE PRN MG: 2 INJECTION, SOLUTION INTRAMUSCULAR; INTRAVENOUS at 16:17

## 2019-06-02 RX ADMIN — ATROPINE SULFATE PRN DRP: 10 SOLUTION/ DROPS OPHTHALMIC at 12:56

## 2019-06-02 RX ADMIN — CHLORHEXIDINE GLUCONATE 0.12% ORAL RINSE SCH ML: 1.2 LIQUID ORAL at 02:53

## 2019-06-02 RX ADMIN — CHLORHEXIDINE GLUCONATE 0.12% ORAL RINSE SCH: 1.2 LIQUID ORAL at 12:53

## 2019-06-02 RX ADMIN — PROPOFOL SCH MLS/HR: 10 INJECTION, EMULSION INTRAVENOUS at 09:00

## 2019-06-02 RX ADMIN — ATROPINE SULFATE PRN DRP: 10 SOLUTION/ DROPS OPHTHALMIC at 16:18

## 2019-06-02 RX ADMIN — FAMOTIDINE SCH MG: 10 INJECTION, SOLUTION INTRAVENOUS at 10:32

## 2019-06-02 RX ADMIN — CHLORHEXIDINE GLUCONATE 0.12% ORAL RINSE SCH ML: 1.2 LIQUID ORAL at 22:23

## 2019-06-02 RX ADMIN — MORPHINE SULFATE PRN MG: 2 INJECTION, SOLUTION INTRAMUSCULAR; INTRAVENOUS at 22:23

## 2019-06-02 RX ADMIN — LEVETIRACETAM SCH MLS/HR: 5 INJECTION INTRAVENOUS at 06:03

## 2019-06-02 RX ADMIN — CHLORHEXIDINE GLUCONATE 0.12% ORAL RINSE SCH: 1.2 LIQUID ORAL at 19:55

## 2019-06-02 RX ADMIN — CHLORHEXIDINE GLUCONATE 0.12% ORAL RINSE SCH ML: 1.2 LIQUID ORAL at 06:05

## 2019-06-02 RX ADMIN — SODIUM CHLORIDE, SODIUM LACTATE, POTASSIUM CHLORIDE, AND CALCIUM CHLORIDE SCH MLS/HR: 600; 310; 30; 20 INJECTION, SOLUTION INTRAVENOUS at 10:39

## 2019-06-02 RX ADMIN — ATROPINE SULFATE PRN DRP: 10 SOLUTION/ DROPS OPHTHALMIC at 22:32

## 2019-06-02 RX ADMIN — CHLORHEXIDINE GLUCONATE 0.12% ORAL RINSE SCH ML: 1.2 LIQUID ORAL at 10:32

## 2019-06-02 NOTE — PN
Date of Service: 06/02/19


Critical Care Services: 





waiting for family meeting today for terminal extubation 


withdrawing to pain


remains on low vent settings 


Vital Signs: 











Temp Pulse Resp BP SpO2 FiO2


 


98.1 F 99 20 169/70 99 45


 


06/02/19 07:00 06/02/19 10:00 06/02/19 06:00 06/02/19 10:00 06/02/19 10:00 06/02 /19 04:44











Physical Exam: 


Gen: intubated. sedated





Lungs: decreased BS's 





Cardiac:  RRR





Abdomen: + BS's. Soft, NTP





Extremities: No MATTHEW





Neuro:  withdraws to pain 





Fluid Balance (Past 24 Hours): 


I=     O=     Net 


 Intake & Output











 05/31/19 06/01/19 06/02/19 06/03/19





 06:59 06:59 06:59 06:59


 


Intake Total  100 8856 


 


Output Total   624 70


 


Balance  100 8232 -70


 


Weight  90 lb 118 lb 6.212 oz 


 


Intake:    


 


  IV Fluids  100 980 


 


    CLIJNDAMYCIN 900MG  100  


 


    LR   880 


 


    PB - Keppra   100 


 


  IVPB   7733 


 


    LR   7633 


 


    PB - Keppra   100 


 


  Medicated IV   143 


 


    CC - Propofol/Diprivan   143 


 


Output:    


 


  VEL #1   110 10


 


  Cranial Drain   19 0


 


  Leonard   495 60





 





ADLs: Meal  Record                                         Start:  06/01/19 07:

17


Freq:   09,13,18                                           Status: Hold        

  


Protocol:                                                                      

  


 Created      06/01/19 07:17  System  (Rec: 06/01/19 07:17  System  ICU-C12)


Intake and Output                                          Start:  05/31/19 23:

56


Freq:                                                      Status: Active      

  


Protocol:                                                                      

  


 Created      05/31/19 23:56  System  (Rec: 05/31/19 23:56  System  ED-C26)


 Document     06/01/19 08:03  [id number]  (Rec: 06/01/19 08:03  [id number]  ICU-M35)


Intake and Output                                          Start:  06/01/19 07:

17


Freq:   Q1HR                                               Status: Active      

  


Protocol:                                                                      

  


 Created      06/01/19 07:17  System  (Rec: 06/01/19 07:17  System  ICU-C12)


 Document     06/01/19 07:36  [id number]  (Rec: 06/01/19 07:36  [id number]  ICU-M35)


 Document     06/01/19 08:00  [id number]  (Rec: 06/01/19 10:06  [id number]  ICU-M24)


 Document     06/01/19 09:00  [id number]  (Rec: 06/01/19 10:06  [id number]  ICU-M24)


 Document     06/01/19 10:00  [id number]  (Rec: 06/01/19 10:06  [id number]  ICU-M24)


 Document     06/01/19 11:00  [id number]  (Rec: 06/01/19 11:41  [id number]  ICU-M35)


 Document     06/01/19 15:00  [id number]  (Rec: 06/01/19 15:51  [id number]  ICU-C07)


 Document     06/01/19 17:00  [id number]  (Rec: 06/01/19 18:10  [id number]  ICU-C07)


 Document     06/01/19 18:16  [id number]  (Rec: 06/01/19 18:17  [id number]  ICU-C07)


 Document     06/01/19 19:51  [id number]  (Rec: 06/01/19 19:55  [id number]  ICU-M35)


 Document     06/01/19 22:00  [id number]  (Rec: 06/01/19 22:02  [id number]  ICU-C07)


 Document     06/01/19 22:56  [id number]  (Rec: 06/01/19 22:57  [id number]  ICU-C07)


 Document     06/01/19 23:07  [id number]  (Rec: 06/01/19 23:07  [id number]  ICU-C07)


 Document     06/02/19 01:00  [id number]  (Rec: 06/02/19 02:26  [id number]  ICU-C07)


 Document     06/02/19 02:00  [id number]  (Rec: 06/02/19 02:27  [id number]  ICU-C07)


 Document     06/02/19 04:00  AVJ8784  (Rec: 06/02/19 05:32  [id number]  ICU-C07)


 Document     06/02/19 05:00  HRR5538  (Rec: 06/02/19 05:32  [id number]  ICU-C07)


 Document     06/02/19 06:00  [id number]  (Rec: 06/02/19 06:07  [id number]  ICU-M35)


 Document     06/02/19 07:00  [id number]  (Rec: 06/02/19 10:15  [id number]  ICU-M24)


 Document     06/02/19 08:00  [id number]  (Rec: 06/02/19 10:15  [id number]  ICU-M24)


 Document     06/02/19 09:00  [id number]  (Rec: 06/02/19 10:15  [id number]  ICU-M24)


 Document     06/02/19 10:00  [id number]  (Rec: 06/02/19 10:15  [id number]  ICU-M24)








Plan: 











Critical Care Time:

## 2019-06-02 NOTE — PN
Progress Note





- Progress Note


Date of Service: 06/02/19


SOAP: 


Subjective:


[]POD # 1


Remains sedated on vent


Discussed with Dr. Mckee who has assumed care and made her comfort measures 

only








Objective:


[]SHERRI


Withdraws to painful stimulus readily even while sedated


Breathing over ventilator








Assessment:


[]Difficult to assess neurologically since sedated and post op scan canceled 

per Dr. Mckee








Plan:


[]Plan extubation today hopefully after weaning from sedation


Doubt clinically she is ventilator dependent at this time

## 2019-06-03 LAB
BASOPHILS # BLD AUTO: 0.5 10^3/UL (ref 0–0.2)
BASOPHILS # BLD AUTO: 7.2 10^3/UL (ref 0–0.2)
EOSINOPHIL # BLD AUTO: 1.1 10^3/UL (ref 0–0.6)
EOSINOPHIL # BLD AUTO: 1.2 10^3/UL (ref 0–0.6)
LYMPHOCYTES # BLD AUTO: 9.1 10^3/UL (ref 1–4.8)
LYMPHOCYTES # BLD AUTO: 9.3 10^3/UL (ref 1–4.8)
MONOCYTES # BLD AUTO: 2.5 10^3/UL (ref 0–0.8)
MONOCYTES # BLD AUTO: 2.7 10^3/UL (ref 0–0.8)
NEUTROPHILS # BLD AUTO: 72.8 10^3/UL (ref 1.5–7.7)
NRBC # BLD AUTO: 0.2 10^3/UL
NRBC # BLD AUTO: 0.2 10^3/UL
NRBC BLD QL AUTO: 0
NRBC BLD QL AUTO: 0.2

## 2019-06-03 RX ADMIN — CHLORHEXIDINE GLUCONATE 0.12% ORAL RINSE SCH ML: 1.2 LIQUID ORAL at 02:41

## 2019-06-03 RX ADMIN — LORAZEPAM PRN MG: 2 INJECTION INTRAMUSCULAR; INTRAVENOUS at 02:40

## 2019-06-03 RX ADMIN — MORPHINE SULFATE PRN MG: 2 INJECTION, SOLUTION INTRAMUSCULAR; INTRAVENOUS at 00:14

## 2019-06-03 RX ADMIN — MORPHINE SULFATE PRN MG: 2 INJECTION, SOLUTION INTRAMUSCULAR; INTRAVENOUS at 02:04

## 2019-06-03 NOTE — DS
CC:  Anastasiia Marte MD *



DEATH SUMMARY:

 

DATE OF ADMISSION:  19

 

DATE OF DEATH:  19

 

FINAL DIAGNOSES:

1.  Subdural hematoma.

2.  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

3.  History of depression.

4.  History of hypertension.

5.  Anemia.

6.  History of anxiety.

 

HISTORY:  Danae Ferreira was an 84-year-old woman admitted after falling at 
assisted living.  Please see the dictated admission note for details of the 
present illness, past medical history, family history, social and personal 
history, review of systems, physical examination.

 

LABORATORY DATA:  CBC on 19; WBC 86.3, H and H 11.7/40, PLT 3593K, 11% 
immature granulocytes, 60% neutrophils, 11% bands, 10% lymphocytes, 12% 
reactive lymphs, 5% monocytes, 2% eosinophils.  Differential; 61.2% neutrophils
, 19% lymphocytes, 4.3% monocytes, 1.7% eosinophils.  Hematologist/pathologist 
report pending.  CBC on 19, WBC 80.4, H and H 8/27, MCV 71, PLT 2459K. 
Differential; 7% immature granulocytes, 80% neutrophils, 2% bands, 7% 
lymphocytes, 1% monocytes, 1% eosinophils, 5% metamyelocytes, 4% blasts.  H and 
H on 19 one hour later was 9.2/30.  ABGs on 19, pH 7.18, pCO2 42, 
pO2 160.  ABGs on 19 at 7:12, pH 7.28, pCO2 38, pO2 89.  



Chemistries on 19 at 0019, sodium 134, potassium 4.8, chloride 106, CO2 18
, BUN and creatinine 40/1.43, glucose 182.  Rest of her comprehensive metabolic 
panel was within normal limits except for total protein 6, globulin 1.9, 
troponin was slightly elevated at 0.05.  Repeat BMP on 19 at 0616, sodium 
134, potassium 4.9, chloride 109, CO2 17, BUN and creatinine 38/1.33, glucose 
223.  Calcium 7.2.  MRSA not detected.  Blood was O positive.  The patient 
received 2 units of packed cells.

 

IMAGING:  Brain CT on 19 showed a right subdural hematoma measuring up to 
15 mm in thickness with mixed primarily intermediate attenuation components 
overlying the frontal, temporal and parietal lobes.  There is associated 
midline shift right to left of 7 mm at the level of the mildly dilated 
ventricles.

 

Cervical spine CT 19, no acute fractures or subluxation of the cervical 
spine.

 

Chest, abdomen, and pelvis CT showed hepatosplenomegaly, moderate pericardial 
effusion with a large heart, osteopenia, moderate-to-severe skeletal 
degenerative changes.

 

Chest x-ray 19 showed cardiogenic pulmonary edema, endotracheal tube.

 

EKG on 19, 2357 showed probable LVH with secondary repolarization 
abnormalities.  Axis more rightward compared to 10/19/17 EKG.  Limb lead 
voltages diminished and anterior voltages increased.

 

HOSPITAL COURSE:  The patient was initially evaluated in the emergency room. 
Neurosurgery was contacted.  She received 1 g of Keppra and it was advised to 
keep her target systolic blood pressure less than 160.  Her initial blood 
pressure was 207/118 compared to the initial presentation of 179/107.  She was 
given labetalol 5 mg and blood pressure lowered to 174/97 with heart rate in 
the 80s.  She then received another 5 mg of labetalol and target blood pressure 
was achieved at 153/97 with heart rate remaining in the 70 to 80s.  
Neurosurgeon and PA came in and planned for emergent evacuation of SDH.  
Daughter gave consent.  Patient was drowsy but arousable.  While in the 
emergency room, she did deteriorate, began to vomit, was diaphoretic prior to 
be taken to the OR.  The daughter, who was contacted said that she would go 
along with whatever her mother wanted to do.  The patient was taken to the OR.  
The subdural hematoma was evacuated.  She returned to the ICU, intubated on 
ventilator.  I saw her the next morning.  I reviewed her MOLST form, which 
stated DNR and do not intubate.  She was nonresponsive.  I discussed with her 
family.  I contacted her healthcare proxy, Leyla Ferreira, who is her daughter-in-
law in [place].  I requested that she come up to [place].  She and her  
made arrangements to come up.  Based on the patient's MOLST and previous wishes
, the family decided to remove the ventilator.  It was felt that she had  
probable acute leukemia.  This was discussed with oncologist on-call, Dr. Mcwilliams. She did not regain consciousness.  She had large amount of bleeding from 
her surgical site.  She was seen by the intensivist, Dr. Queen.  He agreed with 
terminal extubation.  This was done at 12:50 p.m. on 19.  The patient was 
breathing but appeared comfortable after extubation.  The patient did not 
regain consciousness and was given morphine, lorazepam p.r.n. as well as 
atropine for secretions.  She had drop in blood pressures throughout the night.
  She  at 03:15 a.m. on 19.  Family was notified.

 

 [id number]/CPS #: [id number]

MTDD

## 2019-06-04 NOTE — OP
DATE OF OPERATION:  06/01/19 - ROOM #ICU-10

 

DATE OF BIRTH:  [birth year]

 

PRIMARY SURGEON:  Dylan Poe MD.

 

FIRST ASSISTANT:  FLORA Betancur.

 

ANESTHESIA:  General.

 

PRE-OP DIAGNOSIS:  Right acute subdural hematoma.

 

POST-OP DIAGNOSIS:  Right acute subdural hematoma.

 

OPERATIVE PROCEDURE:  Right frontotemporoparietal craniotomy with evacuation of 
right acute subdural hematoma, partial temporal lobectomy.

 

DESCRIPTION OF PROCEDURE:  After satisfactory general anesthesia was obtained, 
the right side of the head was clipped, prepped and draped in sterile manner 
for a right-sided trauma craniotomy flap.  A skin incision was outlined at 
zygomatic arch 1 cm anterior to the tragus and is tinted up in a question nesha 
shape into the parietooccipital area and then curving anteriorly 3 cm to the 
right of midline ending in the mid frontal region.  This incision was 
infiltrated with 1% Xylocaine with epinephrine, after which it was turned down 
sharply to the subcutaneous tissues and galea.  The scalp flap was reflected 
anteriorly.  The temporalis muscle and fascia were divided to enable placement 
of bur holes, one at the most medial frontal aspect of exposure, the second in 
the temporal region and a third at the posterior aspect of the exposure.  A 
freed bone flap was then removed utilizing the craniotome.  The dura was quite 
adherent to bone and a minimal amount of the dura was left behind.  Upon 
turning the bone flap, there was noted to be a marked acute subdural 
collection.  This was decompressed utilizing suction and irrigation. Initially, 
the brain was quite swollen, but with additional mannitol and hyperventilation, 
the intracranial pressure became more manageable.  There continued to be 
significant bleeding from beneath the mesial surface of the temporal lobe.  
This required a partial temporal lobectomy to enable identification of the 
bleeding site.  Ultimately, a bleeding site was identified in the region of the 
petrosal bone posteriorly and medially in the temporal exposure.  This was 
controlled with the bipolar forceps and Gelfoam.  The hemispheric surface was 
explored and was irrigated free of any subdural blood.  Two pieces of DuraGen 
artificial dura were then used to cover the exposed brain.  The cranial flap 
was replaced with plates and screws.  Prior to placement of the cranial flap, a 
subdural drain was placed and tunneled out anteriorly.  A subgaleal drain was 
in place and tunneled out toward the left side.  The galea was then 
reapproximated with 0 Vicryl suture.  The subcutaneous tissues were closed with 
3-0 Vicryl suture and the skin closed with skin clips.  The estimated blood 
loss was 500 cc and the final sponge, padding and needle counts were correct.  
The patient was taken to the intensive care unit, intubated and in critical 
condition.

 

 [id number]/CPS #: [id number]

MTDD